# Patient Record
Sex: FEMALE | Race: AMERICAN INDIAN OR ALASKA NATIVE | Employment: FULL TIME | URBAN - METROPOLITAN AREA
[De-identification: names, ages, dates, MRNs, and addresses within clinical notes are randomized per-mention and may not be internally consistent; named-entity substitution may affect disease eponyms.]

---

## 2017-07-25 ENCOUNTER — OFFICE VISIT (OUTPATIENT)
Dept: OBGYN CLINIC | Facility: CLINIC | Age: 37
End: 2017-07-25

## 2017-07-25 VITALS
HEART RATE: 63 BPM | WEIGHT: 131.81 LBS | SYSTOLIC BLOOD PRESSURE: 114 MMHG | DIASTOLIC BLOOD PRESSURE: 76 MMHG | BODY MASS INDEX: 26 KG/M2

## 2017-07-25 DIAGNOSIS — Z01.419 WELL WOMAN EXAM WITH ROUTINE GYNECOLOGICAL EXAM: Primary | ICD-10-CM

## 2017-07-25 PROCEDURE — 99395 PREV VISIT EST AGE 18-39: CPT | Performed by: OBSTETRICS & GYNECOLOGY

## 2017-07-25 RX ORDER — FLUCONAZOLE 150 MG/1
150 TABLET ORAL ONCE
Qty: 2 TABLET | Refills: 0 | Status: SHIPPED | OUTPATIENT
Start: 2017-07-25 | End: 2017-07-25

## 2017-07-25 NOTE — PROGRESS NOTES
HPI:    Patient ID: Tiffanie Grimaldo is a 39year old female. HPI  Well woman visit  Total abdominal hysterectomy last year. She feels \"great\" no longer having pelvic abdominal and back pain. No longer having horrible vaginal bleeding and menstruation. Never Used                      Alcohol use: No                 PHYSICAL EXAM:    Physical Exam   Constitutional: She appears well-developed and well-nourished. HENT:   Head: Normocephalic. Eyes: Pupils are equal, round, and reactive to light.    Neck: Prescriptions Disp Refills    fluconazole (DIFLUCAN) 150 MG Oral Tab 2 tablet 0      Sig: Take 1 tablet (150 mg total) by mouth once.  Take second tablet three days after first           Imaging & Referrals:  None        QT#[id number]

## 2018-04-07 ENCOUNTER — CHARTING TRANS (OUTPATIENT)
Dept: OTHER | Age: 38
End: 2018-04-07

## 2018-08-18 ENCOUNTER — OFFICE VISIT (OUTPATIENT)
Dept: ENDOCRINOLOGY CLINIC | Facility: CLINIC | Age: 38
End: 2018-08-18
Payer: COMMERCIAL

## 2018-08-18 VITALS
HEIGHT: 60 IN | WEIGHT: 127 LBS | DIASTOLIC BLOOD PRESSURE: 82 MMHG | HEART RATE: 59 BPM | SYSTOLIC BLOOD PRESSURE: 120 MMHG | BODY MASS INDEX: 24.94 KG/M2

## 2018-08-18 DIAGNOSIS — Z13.9 SCREENING FOR CONDITION: ICD-10-CM

## 2018-08-18 DIAGNOSIS — Z13.1 DIABETES MELLITUS SCREENING: Primary | ICD-10-CM

## 2018-08-18 DIAGNOSIS — Z13.29 THYROID DISORDER SCREENING: ICD-10-CM

## 2018-08-18 DIAGNOSIS — E16.1 REACTIVE HYPOGLYCEMIA: ICD-10-CM

## 2018-08-18 LAB
CARTRIDGE LOT#: NORMAL NUMERIC
GLUCOSE BLOOD: 153
HEMOGLOBIN A1C: 5.3 % (ref 4.3–5.6)
TEST STRIP LOT #: NORMAL NUMERIC

## 2018-08-18 PROCEDURE — 82962 GLUCOSE BLOOD TEST: CPT | Performed by: INTERNAL MEDICINE

## 2018-08-18 PROCEDURE — 36416 COLLJ CAPILLARY BLOOD SPEC: CPT | Performed by: INTERNAL MEDICINE

## 2018-08-18 PROCEDURE — 99203 OFFICE O/P NEW LOW 30 MIN: CPT | Performed by: INTERNAL MEDICINE

## 2018-08-18 PROCEDURE — 99212 OFFICE O/P EST SF 10 MIN: CPT | Performed by: INTERNAL MEDICINE

## 2018-08-18 PROCEDURE — 83036 HEMOGLOBIN GLYCOSYLATED A1C: CPT | Performed by: INTERNAL MEDICINE

## 2018-08-18 RX ORDER — BLOOD SUGAR DIAGNOSTIC
STRIP MISCELLANEOUS
Qty: 50 STRIP | Refills: 0 | Status: SHIPPED | OUTPATIENT
Start: 2018-08-18 | End: 2019-02-12 | Stop reason: ALTCHOICE

## 2018-08-18 RX ORDER — MULTIVIT-MIN/IRON FUM/FOLIC AC 7.5 MG-4
1 TABLET ORAL DAILY
COMMUNITY

## 2018-08-18 NOTE — H&P
New Patient Evaluation - History and Physical    CONSULT - Reason for Visit: Episodes of shaking and weakness, Evaluation of thyroid function  Requesting Physician: Self referral    CHIEF COMPLAINT:    Episodes of shaking and weakness  Evaluation of thyroi ALLERGIES:    Plasma, Human           HIVES    SOCIAL HISTORY:    Social History    Marital status:              Spouse name:                       Years of education:                 Number of children:               Social History Main Topic no adenopathy, thyroid symmetric, not enlarged and no tenderness  HEMATOLOGIC/LYMPHATICS:  no cervical lymphadenopathy and no supraclavicular lymphadenopathy  LUNGS: clear to auscultation bilaterally, no crackles or wheezing  CARDIOVASCULAR:  regular rate Metabolic Panel (8)      TSH W Reflex To Free T4      8/18/2018  Familia Naranjo MD

## 2018-08-18 NOTE — PROGRESS NOTES
Patient presents for a freestyle yolanda CGM placement. Patient provided with instructions including to not dive greater than 3 feet under water while swimming and to not spend more than 30 minutes bathing at a time.  Patient instructed to bring the sensor ba

## 2018-08-28 ENCOUNTER — OFFICE VISIT (OUTPATIENT)
Dept: ENDOCRINOLOGY CLINIC | Facility: CLINIC | Age: 38
End: 2018-08-28
Payer: COMMERCIAL

## 2018-08-28 VITALS
HEIGHT: 60 IN | HEART RATE: 67 BPM | DIASTOLIC BLOOD PRESSURE: 85 MMHG | BODY MASS INDEX: 24.9 KG/M2 | SYSTOLIC BLOOD PRESSURE: 124 MMHG | WEIGHT: 126.81 LBS

## 2018-08-28 DIAGNOSIS — R25.1 EPISODE OF SHAKING: Primary | ICD-10-CM

## 2018-08-28 PROCEDURE — 99213 OFFICE O/P EST LOW 20 MIN: CPT | Performed by: INTERNAL MEDICINE

## 2018-08-28 PROCEDURE — 99212 OFFICE O/P EST SF 10 MIN: CPT | Performed by: INTERNAL MEDICINE

## 2018-08-28 PROCEDURE — 95251 CONT GLUC MNTR ANALYSIS I&R: CPT | Performed by: INTERNAL MEDICINE

## 2018-08-28 NOTE — PROGRESS NOTES
Return Office Visit     CHIEF COMPLAINT:  Patient presents with: Other: Screening for hypoglycemia. HISTORY OF PRESENT ILLNESS:  Eleonora Jang is a 40year old female who presents for follow up for evaluation of episodes of shaking and weakness.   H edema  Endocrine: Negative for: polyuria, polydypsia  Skin: Negative for: rash, blister, cellulitis,       PHYSICAL EXAM:    08/28/18  1741   BP: 124/85   Pulse: 67   Weight: 126 lb 12.8 oz (57.5 kg)   Height: 5' (1.524 m)     BMI: Body mass index is 24.76

## 2018-11-01 VITALS
RESPIRATION RATE: 15 BRPM | HEIGHT: 60 IN | WEIGHT: 127 LBS | HEART RATE: 60 BPM | BODY MASS INDEX: 24.94 KG/M2 | TEMPERATURE: 98.7 F

## 2019-01-15 ENCOUNTER — OFFICE VISIT (OUTPATIENT)
Dept: OTOLARYNGOLOGY | Facility: CLINIC | Age: 39
End: 2019-01-15
Payer: COMMERCIAL

## 2019-01-15 VITALS — TEMPERATURE: 99 F | DIASTOLIC BLOOD PRESSURE: 82 MMHG | SYSTOLIC BLOOD PRESSURE: 124 MMHG

## 2019-01-15 DIAGNOSIS — J32.9 CHRONIC SINUSITIS, UNSPECIFIED LOCATION: ICD-10-CM

## 2019-01-15 DIAGNOSIS — J34.2 DEVIATED NASAL SEPTUM: Primary | ICD-10-CM

## 2019-01-15 PROCEDURE — 99203 OFFICE O/P NEW LOW 30 MIN: CPT | Performed by: OTOLARYNGOLOGY

## 2019-01-15 PROCEDURE — 99212 OFFICE O/P EST SF 10 MIN: CPT | Performed by: OTOLARYNGOLOGY

## 2019-01-15 RX ORDER — MONTELUKAST SODIUM 10 MG/1
10 TABLET ORAL NIGHTLY
Qty: 30 TABLET | Refills: 3 | Status: SHIPPED | OUTPATIENT
Start: 2019-01-15

## 2019-01-15 RX ORDER — AZELASTINE 1 MG/ML
2 SPRAY, METERED NASAL 2 TIMES DAILY
Qty: 1 BOTTLE | Refills: 0 | Status: SHIPPED | OUTPATIENT
Start: 2019-01-15 | End: 2019-02-12

## 2019-01-15 RX ORDER — AMOXICILLIN AND CLAVULANATE POTASSIUM 875; 125 MG/1; MG/1
1 TABLET, FILM COATED ORAL EVERY 12 HOURS
Qty: 20 TABLET | Refills: 0 | Status: SHIPPED | OUTPATIENT
Start: 2019-01-15 | End: 2020-02-03

## 2019-01-15 NOTE — PROGRESS NOTES
Lennox Overly is a 45year old female.   Patient presents with:  Ear Problem: chronic right ear infection  Sinus Problem: sinus congestion,pressure headache      HISTORY OF PRESENT ILLNESS    She presents with a long history of chronic ear problems and sinu intolerance and heat intolerance. Neuro Negative Tremors. Psych Negative Anxiety and depression. Integumentary Negative Frequent skin infections, pigment change and rash. Hema/Lymph Negative Easy bleeding and easy bruising.            PHYSICAL EXAM (twelve) hours. , Disp: 60 tablet, Rfl: 3  •  Azelastine HCl 0.1 % Nasal Solution, 2 sprays by Nasal route 2 (two) times daily. , Disp: 1 Bottle, Rfl: 0  •  Multiple Vitamins-Minerals (MULTI-VITAMIN/MINERALS) Oral Tab, Take 1 tablet by mouth daily.  Taking 3-

## 2019-01-16 ENCOUNTER — TELEPHONE (OUTPATIENT)
Dept: OTOLARYNGOLOGY | Facility: CLINIC | Age: 39
End: 2019-01-16

## 2019-01-16 NOTE — TELEPHONE ENCOUNTER
Left message for pt to call back to inform that she can schedule CT sinus at this time, authorization number [id number], valid 1-16-19 thru 2-14-19 to be done at [facility]. It is the patient's responsibility to verify benefits and out of po

## 2019-01-19 ENCOUNTER — TELEPHONE (OUTPATIENT)
Dept: OTOLARYNGOLOGY | Facility: CLINIC | Age: 39
End: 2019-01-19

## 2019-01-30 ENCOUNTER — HOSPITAL ENCOUNTER (OUTPATIENT)
Dept: CT IMAGING | Facility: HOSPITAL | Age: 39
Discharge: HOME | End: 2019-01-30
Attending: OTOLARYNGOLOGY
Payer: COMMERCIAL

## 2019-01-30 ENCOUNTER — HOSPITAL ENCOUNTER (OUTPATIENT)
Dept: CT IMAGING | Facility: HOSPITAL | Age: 39
End: 2019-01-30
Attending: OTOLARYNGOLOGY
Payer: COMMERCIAL

## 2019-01-30 DIAGNOSIS — J34.2 DEVIATED NASAL SEPTUM: ICD-10-CM

## 2019-01-30 PROCEDURE — 70486 CT MAXILLOFACIAL W/O DYE: CPT | Performed by: OTOLARYNGOLOGY

## 2019-02-12 ENCOUNTER — OFFICE VISIT (OUTPATIENT)
Dept: OTOLARYNGOLOGY | Facility: CLINIC | Age: 39
End: 2019-02-12
Payer: COMMERCIAL

## 2019-02-12 VITALS
BODY MASS INDEX: 24.94 KG/M2 | TEMPERATURE: 99 F | SYSTOLIC BLOOD PRESSURE: 120 MMHG | WEIGHT: 127 LBS | DIASTOLIC BLOOD PRESSURE: 72 MMHG | HEIGHT: 60 IN

## 2019-02-12 DIAGNOSIS — J34.2 DEVIATED NASAL SEPTUM: Primary | ICD-10-CM

## 2019-02-12 PROCEDURE — 99212 OFFICE O/P EST SF 10 MIN: CPT | Performed by: OTOLARYNGOLOGY

## 2019-02-12 PROCEDURE — 99214 OFFICE O/P EST MOD 30 MIN: CPT | Performed by: OTOLARYNGOLOGY

## 2019-02-12 RX ORDER — CELECOXIB 200 MG/1
200 CAPSULE ORAL DAILY PRN
Qty: 30 CAPSULE | Refills: 0 | Status: SHIPPED | OUTPATIENT
Start: 2019-02-12 | End: 2019-03-14

## 2019-02-12 RX ORDER — AZELASTINE 1 MG/ML
2 SPRAY, METERED NASAL 2 TIMES DAILY
Qty: 1 BOTTLE | Refills: 3 | Status: SHIPPED | OUTPATIENT
Start: 2019-02-12 | End: 2020-02-03

## 2019-02-12 RX ORDER — CHOLECALCIFEROL (VITAMIN D3) 50 MCG
2000 TABLET ORAL
COMMUNITY
Start: 2018-02-12

## 2019-02-12 NOTE — PROGRESS NOTES
Hoda Jiménez is a 45year old female. Patient presents with:   Follow - Up: 1 mo f/u to go over CT results and sinusitis-pt has not been taking medications because they make her tired      HISTORY OF PRESENT ILLNESS  She presents with a long history of ch Multiple allergies        Past Surgical History:   Procedure Laterality Date   • PRIOR MYOMECTOMY      3 prev surgeries   • REMOVAL OF OVARIAN CYST(S)  2010   • TOTAL ABDOMINAL HYSTERECTOMY  7/13/16         REVIEW OF SYSTEMS    System Neg/Pos Details   Con Posterior cervical. Supraclavicular.    TMJ   tender to palpation on the right   Nose/Mouth/Throat Normal External nose - Normal. Lips/teeth/gums - Normal. Tonsils - Normal. Oropharynx - Normal.   Nose/Mouth/Throat Normal Nares - Right: Normal Left: Normal.

## 2019-03-01 ENCOUNTER — HOSPITAL ENCOUNTER (EMERGENCY)
Facility: HOSPITAL | Age: 39
Discharge: HOME | End: 2019-03-01
Attending: EMERGENCY MEDICINE
Payer: COMMERCIAL

## 2019-03-01 VITALS
SYSTOLIC BLOOD PRESSURE: 141 MMHG | OXYGEN SATURATION: 96 % | DIASTOLIC BLOOD PRESSURE: 101 MMHG | TEMPERATURE: 98 F | HEIGHT: 60 IN | HEART RATE: 72 BPM | WEIGHT: 128 LBS | BODY MASS INDEX: 25.13 KG/M2 | RESPIRATION RATE: 18 BRPM

## 2019-03-01 DIAGNOSIS — R10.30 LOWER ABDOMINAL PAIN: Primary | ICD-10-CM

## 2019-03-01 LAB
ALBUMIN SERPL-MCNC: 3.8 G/DL (ref 3.4–5)
ALP LIVER SERPL-CCNC: 81 U/L (ref 37–98)
ALT SERPL-CCNC: 49 U/L (ref 13–56)
ANION GAP SERPL CALC-SCNC: 5 MMOL/L (ref 0–18)
AST SERPL-CCNC: 22 U/L (ref 15–37)
BASOPHILS # BLD AUTO: 0.01 X10(3) UL (ref 0–0.2)
BASOPHILS NFR BLD AUTO: 0.2 %
BILIRUB DIRECT SERPL-MCNC: <0.1 MG/DL (ref 0–0.2)
BILIRUB SERPL-MCNC: 0.3 MG/DL (ref 0.1–2)
BILIRUB UR QL: NEGATIVE
BUN BLD-MCNC: 9 MG/DL (ref 7–18)
BUN/CREAT SERPL: 12.7 (ref 10–20)
CALCIUM BLD-MCNC: 9.1 MG/DL (ref 8.5–10.1)
CHLORIDE SERPL-SCNC: 109 MMOL/L (ref 98–107)
CLARITY UR: CLEAR
CO2 SERPL-SCNC: 28 MMOL/L (ref 21–32)
COLOR UR: YELLOW
CREAT BLD-MCNC: 0.71 MG/DL (ref 0.55–1.02)
DEPRECATED RDW RBC AUTO: 39.7 FL (ref 35.1–46.3)
EOSINOPHIL # BLD AUTO: 0.16 X10(3) UL (ref 0–0.7)
EOSINOPHIL NFR BLD AUTO: 2.6 %
ERYTHROCYTE [DISTWIDTH] IN BLOOD BY AUTOMATED COUNT: 12.2 % (ref 11–15)
GLUCOSE BLD-MCNC: 119 MG/DL (ref 70–99)
GLUCOSE UR-MCNC: NEGATIVE MG/DL
HCT VFR BLD AUTO: 40.2 % (ref 35–48)
HGB BLD-MCNC: 13.4 G/DL (ref 12–16)
HGB UR QL STRIP.AUTO: NEGATIVE
IMM GRANULOCYTES # BLD AUTO: 0.03 X10(3) UL (ref 0–1)
IMM GRANULOCYTES NFR BLD: 0.5 %
KETONES UR-MCNC: NEGATIVE MG/DL
LIPASE SERPL-CCNC: 297 U/L (ref 73–393)
LYMPHOCYTES # BLD AUTO: 2.42 X10(3) UL (ref 1–4)
LYMPHOCYTES NFR BLD AUTO: 39.6 %
M PROTEIN MFR SERPL ELPH: 7.5 G/DL (ref 6.4–8.2)
MCH RBC QN AUTO: 29.6 PG (ref 26–34)
MCHC RBC AUTO-ENTMCNC: 33.3 G/DL (ref 31–37)
MCV RBC AUTO: 88.7 FL (ref 80–100)
MONOCYTES # BLD AUTO: 0.71 X10(3) UL (ref 0.1–1)
MONOCYTES NFR BLD AUTO: 11.6 %
NEUTROPHILS # BLD AUTO: 2.78 X10 (3) UL (ref 1.5–7.7)
NEUTROPHILS # BLD AUTO: 2.78 X10(3) UL (ref 1.5–7.7)
NEUTROPHILS NFR BLD AUTO: 45.5 %
NITRITE UR QL STRIP.AUTO: NEGATIVE
OSMOLALITY SERPL CALC.SUM OF ELEC: 294 MOSM/KG (ref 275–295)
PH UR: 6 [PH] (ref 5–8)
PLATELET # BLD AUTO: 213 10(3)UL (ref 150–450)
POTASSIUM SERPL-SCNC: 3.6 MMOL/L (ref 3.5–5.1)
PROT UR-MCNC: NEGATIVE MG/DL
RBC # BLD AUTO: 4.53 X10(6)UL (ref 3.8–5.3)
RBC #/AREA URNS AUTO: 2 /HPF
SODIUM SERPL-SCNC: 142 MMOL/L (ref 136–145)
SP GR UR STRIP: 1.02 (ref 1–1.03)
UROBILINOGEN UR STRIP-ACNC: <2
VIT C UR-MCNC: NEGATIVE MG/DL
WBC # BLD AUTO: 6.1 X10(3) UL (ref 4–11)
WBC #/AREA URNS AUTO: 4 /HPF

## 2019-03-01 PROCEDURE — 99283 EMERGENCY DEPT VISIT LOW MDM: CPT

## 2019-03-01 PROCEDURE — 85025 COMPLETE CBC W/AUTO DIFF WBC: CPT | Performed by: EMERGENCY MEDICINE

## 2019-03-01 PROCEDURE — 80048 BASIC METABOLIC PNL TOTAL CA: CPT | Performed by: EMERGENCY MEDICINE

## 2019-03-01 PROCEDURE — 80076 HEPATIC FUNCTION PANEL: CPT | Performed by: EMERGENCY MEDICINE

## 2019-03-01 PROCEDURE — 36415 COLL VENOUS BLD VENIPUNCTURE: CPT

## 2019-03-01 PROCEDURE — 81001 URINALYSIS AUTO W/SCOPE: CPT | Performed by: EMERGENCY MEDICINE

## 2019-03-01 PROCEDURE — 83690 ASSAY OF LIPASE: CPT | Performed by: EMERGENCY MEDICINE

## 2019-03-01 RX ORDER — DICYCLOMINE HCL 20 MG
20 TABLET ORAL 4 TIMES DAILY PRN
Qty: 30 TABLET | Refills: 0 | Status: SHIPPED | OUTPATIENT
Start: 2019-03-01 | End: 2020-02-03

## 2019-03-02 NOTE — ED NOTES
Patient states that she was constipated 1.5 months ago for 3 weeks. Started a fiber diet and constipation resolved. States she came now due to pain in the same area. Describes it as pinching burning sensation. Not tender to palpation.  Last bm was today at

## 2019-03-03 NOTE — ED PROVIDER NOTES
Patient Seen in: [facility] AND [facility] Emergency Department    History   Patient presents with:  Abdominal Pain      HPI    Patient presents to the ED complaining of pain to her lower abdomen and a bruise over her right lower quadrant.   She states she has ha 18   Temp 98.1 °F (36.7 °C)   Temp src Oral   SpO2 99 %   O2 Device None (Room air)       Physical Exam   Constitutional: She is oriented to person, place, and time. She appears well-developed and well-nourished. No distress.    HENT:   Head: Normocephalic the individual orders.    CBC W/ DIFFERENTIAL         Imaging Results Available and Reviewed while in ED:     ED Medications Administered: Medications - No data to display      MDM      03/01/19  2100 03/01/19  2115 03/01/19  2230   BP: (!) 159/102 (!) 145/ [address]2480  [phone]    Schedule an appointment as soon as possible for a visit in 2 weeks      Nneka Hauser, [address] [place] [id number] [phone]    Schedule an appointment as soon as possible for a visit in 3 days        Medications

## 2019-03-18 ENCOUNTER — OFFICE VISIT (OUTPATIENT)
Dept: GASTROENTEROLOGY | Facility: CLINIC | Age: 39
End: 2019-03-18
Payer: COMMERCIAL

## 2019-03-18 ENCOUNTER — LAB ENCOUNTER (OUTPATIENT)
Dept: LAB | Facility: HOSPITAL | Age: 39
End: 2019-03-18
Attending: INTERNAL MEDICINE
Payer: COMMERCIAL

## 2019-03-18 VITALS
WEIGHT: 130 LBS | DIASTOLIC BLOOD PRESSURE: 88 MMHG | HEIGHT: 60.5 IN | SYSTOLIC BLOOD PRESSURE: 145 MMHG | BODY MASS INDEX: 24.87 KG/M2 | HEART RATE: 65 BPM

## 2019-03-18 DIAGNOSIS — R10.31 RLQ DISCOMFORT: Primary | ICD-10-CM

## 2019-03-18 DIAGNOSIS — R10.31 RLQ DISCOMFORT: ICD-10-CM

## 2019-03-18 DIAGNOSIS — K59.00 CONSTIPATION, UNSPECIFIED CONSTIPATION TYPE: ICD-10-CM

## 2019-03-18 DIAGNOSIS — H04.123 DRY EYES: ICD-10-CM

## 2019-03-18 LAB
CRP SERPL-MCNC: <0.29 MG/DL (ref ?–0.3)
ERYTHROCYTE [SEDIMENTATION RATE] IN BLOOD: 7 MM/HR (ref 0–20)
IGA SERPL-MCNC: 205 MG/DL (ref 70–312)
TSI SER-ACNC: 1.2 MIU/ML (ref 0.36–3.74)

## 2019-03-18 PROCEDURE — 86038 ANTINUCLEAR ANTIBODIES: CPT

## 2019-03-18 PROCEDURE — 99212 OFFICE O/P EST SF 10 MIN: CPT | Performed by: INTERNAL MEDICINE

## 2019-03-18 PROCEDURE — 86039 ANTINUCLEAR ANTIBODIES (ANA): CPT

## 2019-03-18 PROCEDURE — 36415 COLL VENOUS BLD VENIPUNCTURE: CPT

## 2019-03-18 PROCEDURE — 86140 C-REACTIVE PROTEIN: CPT

## 2019-03-18 PROCEDURE — 99203 OFFICE O/P NEW LOW 30 MIN: CPT | Performed by: INTERNAL MEDICINE

## 2019-03-18 PROCEDURE — 85652 RBC SED RATE AUTOMATED: CPT

## 2019-03-18 PROCEDURE — 84443 ASSAY THYROID STIM HORMONE: CPT

## 2019-03-18 PROCEDURE — 82784 ASSAY IGA/IGD/IGG/IGM EACH: CPT

## 2019-03-18 PROCEDURE — 83516 IMMUNOASSAY NONANTIBODY: CPT

## 2019-03-18 NOTE — PATIENT INSTRUCTIONS
1.  Gradually increase fiber to a goal of 20-25 g daily. 2.  Obtain additional blood work. 3.  Make an appointment with a primary care physician: Dr. Pattie Oocnnell or Dr. Chelsea Cristobal at [phone]  4. Further advice pending the above results.

## 2019-03-20 LAB — TTG IGA SER-ACNC: 0.4 U/ML (ref ?–7)

## 2019-03-21 LAB — NUCLEAR IGG TITR SER IF: POSITIVE {TITER}

## 2019-03-23 LAB — ANA NUCLEOLAR TITR SER IF: 80 {TITER}

## 2019-03-25 ENCOUNTER — TELEPHONE (OUTPATIENT)
Dept: GASTROENTEROLOGY | Facility: CLINIC | Age: 39
End: 2019-03-25

## 2019-03-25 NOTE — TELEPHONE ENCOUNTER
Pt requesting to spk w rn regarding lab results received, pt has questions, pls call at:[phone],thanks.

## 2019-03-26 NOTE — TELEPHONE ENCOUNTER
I spoke to Karine. All of her labs were normal with the exception of an JUANPABLO of 1:80 speckled. The significance of this finding is uncertain. I cannot exclude a connective tissue disorder especially in light of the patient's dry eyes and dry mouth.   I h

## 2019-03-27 ENCOUNTER — APPOINTMENT (OUTPATIENT)
Dept: LAB | Facility: HOSPITAL | Age: 39
End: 2019-03-27
Attending: INTERNAL MEDICINE
Payer: COMMERCIAL

## 2019-03-27 ENCOUNTER — OFFICE VISIT (OUTPATIENT)
Dept: RHEUMATOLOGY | Facility: CLINIC | Age: 39
End: 2019-03-27
Payer: COMMERCIAL

## 2019-03-27 VITALS
BODY MASS INDEX: 24.35 KG/M2 | HEIGHT: 61 IN | WEIGHT: 129 LBS | HEART RATE: 69 BPM | SYSTOLIC BLOOD PRESSURE: 139 MMHG | DIASTOLIC BLOOD PRESSURE: 92 MMHG

## 2019-03-27 DIAGNOSIS — R76.8 POSITIVE ANA (ANTINUCLEAR ANTIBODY): ICD-10-CM

## 2019-03-27 DIAGNOSIS — R76.8 POSITIVE ANA (ANTINUCLEAR ANTIBODY): Primary | ICD-10-CM

## 2019-03-27 DIAGNOSIS — R73.09 ELEVATED GLUCOSE: ICD-10-CM

## 2019-03-27 DIAGNOSIS — M35.00 SICCA, UNSPECIFIED TYPE (HCC): ICD-10-CM

## 2019-03-27 DIAGNOSIS — R35.89 POLYURIA: ICD-10-CM

## 2019-03-27 LAB
CRP SERPL-MCNC: 0.44 MG/DL (ref ?–0.3)
ERYTHROCYTE [SEDIMENTATION RATE] IN BLOOD: 12 MM/HR (ref 0–20)
EST. AVERAGE GLUCOSE BLD GHB EST-MCNC: 111 MG/DL (ref 68–126)
HBA1C MFR BLD HPLC: 5.5 % (ref ?–5.7)
HCV AB SERPL QL IA: NONREACTIVE
RHEUMATOID FACT SERPL-ACNC: <10 IU/ML (ref ?–15)

## 2019-03-27 PROCEDURE — 86803 HEPATITIS C AB TEST: CPT

## 2019-03-27 PROCEDURE — 86235 NUCLEAR ANTIGEN ANTIBODY: CPT

## 2019-03-27 PROCEDURE — 86225 DNA ANTIBODY NATIVE: CPT

## 2019-03-27 PROCEDURE — 86140 C-REACTIVE PROTEIN: CPT

## 2019-03-27 PROCEDURE — 99244 OFF/OP CNSLTJ NEW/EST MOD 40: CPT | Performed by: INTERNAL MEDICINE

## 2019-03-27 PROCEDURE — 99212 OFFICE O/P EST SF 10 MIN: CPT | Performed by: INTERNAL MEDICINE

## 2019-03-27 PROCEDURE — 86431 RHEUMATOID FACTOR QUANT: CPT

## 2019-03-27 PROCEDURE — 85652 RBC SED RATE AUTOMATED: CPT

## 2019-03-27 PROCEDURE — 36415 COLL VENOUS BLD VENIPUNCTURE: CPT

## 2019-03-27 PROCEDURE — 83036 HEMOGLOBIN GLYCOSYLATED A1C: CPT

## 2019-03-27 NOTE — PROGRESS NOTES
Rocio Hardin is a 45year old female who presents for Patient presents with:  Abnormal Labs  . HPI:     I had the pleasure of seeing Rocio Hardin on 3/27/2019 for evaluation. She was referred by Dr. Mercedes Ann.     She is a pleasant 45year old w (two) times daily. Disp: 1 Bottle Rfl: 3   Amoxicillin-Pot Clavulanate 875-125 MG Oral Tab Take 1 tablet by mouth every 12 (twelve) hours. Disp: 20 tablet Rfl: 0   Montelukast Sodium 10 MG Oral Tab Take 1 tablet (10 mg total) by mouth nightly.  Disp: 30 tab Neuro:  No numbness or tingling, she was getting migraines/ headache - was getting this end of feb daily, drank a lot of water to hydrate and they improved, , no hx of seizures,   Psych: no hx of anxiety or depression  ENDO: no hx of thyroid disease, no h

## 2019-03-29 LAB — DSDNA AB TITR SER: <10 {TITER}

## 2019-03-29 NOTE — PROGRESS NOTES
HPI:    Patient ID: Tiffanie Grimaldo is a 45year old female. HPI  The patient presents today to discuss erratic bowel habits and right lower quadrant abdominal pain. The patient normally has a bowel movement after eating.   If she eats \"10 meals she wi 12.8 oz (57.5 kg)  08/18/18 : 127 lb (57.6 kg)    Body mass index is 24.97 kg/m². Current Outpatient Medications:  Cholecalciferol (VITAMIN D) 2000 units Oral Tab Take 2,000 Units by mouth.  Disp:  Rfl:    Loratadine-Pseudoephedrine ER 5-120 MG Oral time.   Psychiatric: She has a normal mood and affect. Her behavior is normal.   Nursing note and vitals reviewed.              ASSESSMENT/PLAN:   Rlq discomfort  (primary encounter diagnosis)  Dry eyes  Constipation, unspecified constipation type  The rigo

## 2019-03-31 LAB — SCLERODERMA (SCL-70) (ENA) AB, IGG: 5 AU/ML

## 2020-02-03 ENCOUNTER — OFFICE VISIT (OUTPATIENT)
Dept: OBGYN CLINIC | Facility: CLINIC | Age: 40
End: 2020-02-03
Payer: COMMERCIAL

## 2020-02-03 VITALS — SYSTOLIC BLOOD PRESSURE: 156 MMHG | BODY MASS INDEX: 25 KG/M2 | DIASTOLIC BLOOD PRESSURE: 106 MMHG | WEIGHT: 134 LBS

## 2020-02-03 DIAGNOSIS — Z12.31 SCREENING MAMMOGRAM, ENCOUNTER FOR: Primary | ICD-10-CM

## 2020-02-03 DIAGNOSIS — Z01.419 WELL WOMAN EXAM WITH ROUTINE GYNECOLOGICAL EXAM: ICD-10-CM

## 2020-02-03 DIAGNOSIS — R03.0 ELEVATED BLOOD PRESSURE READING: ICD-10-CM

## 2020-02-03 DIAGNOSIS — B37.3 VAGINAL YEAST INFECTION: ICD-10-CM

## 2020-02-03 PROBLEM — B37.31 VAGINAL YEAST INFECTION: Status: ACTIVE | Noted: 2020-02-03

## 2020-02-03 PROBLEM — R25.1 SHAKING: Status: RESOLVED | Noted: 2018-08-28 | Resolved: 2020-02-03

## 2020-02-03 PROCEDURE — 99395 PREV VISIT EST AGE 18-39: CPT | Performed by: OBSTETRICS & GYNECOLOGY

## 2020-02-03 NOTE — PROGRESS NOTES
HPI:    Patient ID: Rahul Thompson is a 44year old year old female. HPI  Well woman visit  Brings a problem of recurrent yeast infections for the past 2 years she states more recently she has noted worsening and has not resolved with Diflucan tablets. Pelvic exam was performed with patient supine and chaperone present. External genitalia- normal.  Bartholin's and Walford's glands normal.  Urethral meatus- without lesions, mass, or discharge. Urethra- normal without lesion, cyst, mass, or tenderness. 0  [DISCONTINUED] Azelastine HCl 0.1 % Nasal Solution, 2 sprays by Nasal route 2 (two) times daily. , Disp: 1 Bottle, Rfl: 3  [DISCONTINUED] Amoxicillin-Pot Clavulanate 875-125 MG Oral Tab, Take 1 tablet by mouth every 12 (twelve) hours. , Disp: 20 tablet, R

## 2020-02-04 ENCOUNTER — OFFICE VISIT (OUTPATIENT)
Dept: FAMILY MEDICINE CLINIC | Facility: CLINIC | Age: 40
End: 2020-02-04
Payer: COMMERCIAL

## 2020-02-04 ENCOUNTER — LAB ENCOUNTER (OUTPATIENT)
Dept: LAB | Age: 40
End: 2020-02-04
Attending: FAMILY MEDICINE
Payer: COMMERCIAL

## 2020-02-04 VITALS
WEIGHT: 134 LBS | HEART RATE: 62 BPM | BODY MASS INDEX: 25.3 KG/M2 | TEMPERATURE: 98 F | SYSTOLIC BLOOD PRESSURE: 143 MMHG | DIASTOLIC BLOOD PRESSURE: 89 MMHG | HEIGHT: 61 IN

## 2020-02-04 DIAGNOSIS — D50.9 IRON DEFICIENCY ANEMIA, UNSPECIFIED IRON DEFICIENCY ANEMIA TYPE: ICD-10-CM

## 2020-02-04 DIAGNOSIS — R00.2 PALPITATIONS: ICD-10-CM

## 2020-02-04 DIAGNOSIS — Z13.1 DIABETES MELLITUS SCREENING: ICD-10-CM

## 2020-02-04 DIAGNOSIS — I10 ESSENTIAL HYPERTENSION: Primary | ICD-10-CM

## 2020-02-04 DIAGNOSIS — R42 DIZZINESS: ICD-10-CM

## 2020-02-04 DIAGNOSIS — R35.0 URINARY FREQUENCY: ICD-10-CM

## 2020-02-04 DIAGNOSIS — I10 ESSENTIAL HYPERTENSION: ICD-10-CM

## 2020-02-04 LAB
ANION GAP SERPL CALC-SCNC: 4 MMOL/L (ref 0–18)
BASOPHILS # BLD AUTO: 0.02 X10(3) UL (ref 0–0.2)
BASOPHILS NFR BLD AUTO: 0.3 %
BUN BLD-MCNC: 15 MG/DL (ref 7–18)
BUN/CREAT SERPL: 18.1 (ref 10–20)
CALCIUM BLD-MCNC: 9.3 MG/DL (ref 8.5–10.1)
CHLORIDE SERPL-SCNC: 106 MMOL/L (ref 98–112)
CO2 SERPL-SCNC: 30 MMOL/L (ref 21–32)
CREAT BLD-MCNC: 0.83 MG/DL (ref 0.55–1.02)
DEPRECATED RDW RBC AUTO: 39.9 FL (ref 35.1–46.3)
EOSINOPHIL # BLD AUTO: 0.13 X10(3) UL (ref 0–0.7)
EOSINOPHIL NFR BLD AUTO: 1.9 %
ERYTHROCYTE [DISTWIDTH] IN BLOOD BY AUTOMATED COUNT: 12.2 % (ref 11–15)
EST. AVERAGE GLUCOSE BLD GHB EST-MCNC: 108 MG/DL (ref 68–126)
GLUCOSE BLD-MCNC: 96 MG/DL (ref 70–99)
HBA1C MFR BLD HPLC: 5.4 % (ref ?–5.7)
HCT VFR BLD AUTO: 42.8 % (ref 35–48)
HGB BLD-MCNC: 14.3 G/DL (ref 12–16)
IMM GRANULOCYTES # BLD AUTO: 0.03 X10(3) UL (ref 0–1)
IMM GRANULOCYTES NFR BLD: 0.4 %
LYMPHOCYTES # BLD AUTO: 2.28 X10(3) UL (ref 1–4)
LYMPHOCYTES NFR BLD AUTO: 32.6 %
MCH RBC QN AUTO: 29.9 PG (ref 26–34)
MCHC RBC AUTO-ENTMCNC: 33.4 G/DL (ref 31–37)
MCV RBC AUTO: 89.5 FL (ref 80–100)
MONOCYTES # BLD AUTO: 0.83 X10(3) UL (ref 0.1–1)
MONOCYTES NFR BLD AUTO: 11.9 %
NEUTROPHILS # BLD AUTO: 3.7 X10 (3) UL (ref 1.5–7.7)
NEUTROPHILS # BLD AUTO: 3.7 X10(3) UL (ref 1.5–7.7)
NEUTROPHILS NFR BLD AUTO: 52.9 %
OSMOLALITY SERPL CALC.SUM OF ELEC: 291 MOSM/KG (ref 275–295)
PATIENT FASTING Y/N/NP: NO
PLATELET # BLD AUTO: 231 10(3)UL (ref 150–450)
POTASSIUM SERPL-SCNC: 4.2 MMOL/L (ref 3.5–5.1)
RBC # BLD AUTO: 4.78 X10(6)UL (ref 3.8–5.3)
SODIUM SERPL-SCNC: 140 MMOL/L (ref 136–145)
TSI SER-ACNC: 0.98 MIU/ML (ref 0.36–3.74)
WBC # BLD AUTO: 7 X10(3) UL (ref 4–11)

## 2020-02-04 PROCEDURE — 99204 OFFICE O/P NEW MOD 45 MIN: CPT | Performed by: FAMILY MEDICINE

## 2020-02-04 PROCEDURE — 84443 ASSAY THYROID STIM HORMONE: CPT

## 2020-02-04 PROCEDURE — 85025 COMPLETE CBC W/AUTO DIFF WBC: CPT

## 2020-02-04 PROCEDURE — 36415 COLL VENOUS BLD VENIPUNCTURE: CPT

## 2020-02-04 PROCEDURE — 83036 HEMOGLOBIN GLYCOSYLATED A1C: CPT

## 2020-02-04 PROCEDURE — 80048 BASIC METABOLIC PNL TOTAL CA: CPT

## 2020-02-04 RX ORDER — MINERAL OIL 100 MG/100ML
OIL ORAL; TOPICAL
Qty: 1 DEVICE | Refills: 0 | Status: SHIPPED | OUTPATIENT
Start: 2020-02-04

## 2020-02-04 RX ORDER — LISINOPRIL 10 MG/1
10 TABLET ORAL DAILY
Qty: 30 TABLET | Refills: 1 | Status: SHIPPED | OUTPATIENT
Start: 2020-02-04

## 2020-02-04 NOTE — PROGRESS NOTES
Patient presents with:  Blood Pressure: seen by GYN yesterday concerned w/ high bp  Headache: c/o headaches and blurry vision  Chest Pain: c/o left sided pinching pain      HPI:   Gonzalo Weir is a 44year old female who presents to clinic for blood press BASIC METABOLIC PANEL (8); Future  - lisinopril 10 MG Oral Tab; Take 1 tablet (10 mg total) by mouth daily. Dispense: 30 tablet; Refill: 1  - Blood Pressure Monitor Does not apply Device; Use daily. Dispense: 1 Device; Refill: 0    2.  Palpitations  - TSH

## 2020-02-05 LAB
GENITAL VAGINOSIS SCREEN: NEGATIVE
TRICHOMONAS SCREEN: NEGATIVE

## 2020-02-07 ENCOUNTER — TELEPHONE (OUTPATIENT)
Dept: OBGYN CLINIC | Facility: CLINIC | Age: 40
End: 2020-02-07

## 2020-02-07 NOTE — TELEPHONE ENCOUNTER
----- Message from Edgar Gross MD sent at 2/7/2020  9:26 AM CST -----  Please inform that vaginal culture confirms yeast infection. It should respond well to the Terazol vaginal cream that was prescribed.

## 2020-02-10 ENCOUNTER — OFFICE VISIT (OUTPATIENT)
Dept: FAMILY MEDICINE CLINIC | Facility: CLINIC | Age: 40
End: 2020-02-10
Payer: COMMERCIAL

## 2020-02-10 VITALS
HEART RATE: 101 BPM | OXYGEN SATURATION: 97 % | SYSTOLIC BLOOD PRESSURE: 127 MMHG | BODY MASS INDEX: 25.49 KG/M2 | DIASTOLIC BLOOD PRESSURE: 82 MMHG | TEMPERATURE: 104 F | WEIGHT: 135 LBS | HEIGHT: 61 IN

## 2020-02-10 DIAGNOSIS — J06.9 VIRAL URI WITH COUGH: Primary | ICD-10-CM

## 2020-02-10 PROBLEM — Z12.31 SCREENING MAMMOGRAM, ENCOUNTER FOR: Status: RESOLVED | Noted: 2018-08-18 | Resolved: 2020-02-10

## 2020-02-10 PROBLEM — Z87.891 FORMER SMOKER: Status: ACTIVE | Noted: 2019-06-14

## 2020-02-10 PROBLEM — Z83.3 FAMILY HISTORY OF DIABETES MELLITUS (DM): Status: ACTIVE | Noted: 2019-06-14

## 2020-02-10 PROBLEM — R03.0 ELEVATED BLOOD PRESSURE READING: Status: RESOLVED | Noted: 2020-02-03 | Resolved: 2020-02-10

## 2020-02-10 PROBLEM — K76.0 FATTY LIVER DISEASE, NONALCOHOLIC: Status: ACTIVE | Noted: 2020-02-10

## 2020-02-10 PROBLEM — Z90.711 HISTORY OF PARTIAL HYSTERECTOMY: Status: ACTIVE | Noted: 2019-06-14

## 2020-02-10 PROBLEM — Z92.89 HISTORY OF BLOOD TRANSFUSION: Status: ACTIVE | Noted: 2020-02-10

## 2020-02-10 PROBLEM — E55.9 VITAMIN D DEFICIENCY: Status: ACTIVE | Noted: 2018-02-13

## 2020-02-10 PROBLEM — E78.5 DYSLIPIDEMIA: Status: ACTIVE | Noted: 2018-02-13

## 2020-02-10 PROBLEM — Z83.79 FAMILY HISTORY OF FATTY LIVER: Status: ACTIVE | Noted: 2019-06-14

## 2020-02-10 PROBLEM — Z01.419 WELL WOMAN EXAM WITH ROUTINE GYNECOLOGICAL EXAM: Status: RESOLVED | Noted: 2017-07-25 | Resolved: 2020-02-10

## 2020-02-10 PROBLEM — E16.1 REACTIVE HYPOGLYCEMIA: Status: RESOLVED | Noted: 2018-08-18 | Resolved: 2020-02-10

## 2020-02-10 LAB
FLUAV + FLUBV RNA SPEC NAA+PROBE: NEGATIVE
FLUAV + FLUBV RNA SPEC NAA+PROBE: NEGATIVE
FLUAV + FLUBV RNA SPEC NAA+PROBE: POSITIVE

## 2020-02-10 PROCEDURE — 99213 OFFICE O/P EST LOW 20 MIN: CPT | Performed by: FAMILY MEDICINE

## 2020-02-10 RX ORDER — PROMETHAZINE HYDROCHLORIDE AND CODEINE PHOSPHATE 6.25; 1 MG/5ML; MG/5ML
5 SOLUTION ORAL NIGHTLY PRN
Qty: 100 ML | Refills: 0 | Status: SHIPPED | OUTPATIENT
Start: 2020-02-10

## 2020-02-10 RX ORDER — OSELTAMIVIR PHOSPHATE 75 MG/1
75 CAPSULE ORAL 2 TIMES DAILY
Qty: 10 CAPSULE | Refills: 0 | Status: SHIPPED | OUTPATIENT
Start: 2020-02-10 | End: 2020-02-15

## 2020-02-10 NOTE — PROGRESS NOTES
Patient presents with:  Cough: c/o constant cough and sore throat  Headache  Body ache and/or chills    HPI:   Shari Castillo is a 44year old female who presents to clinic with complaints of fever, chills, body ache, cough, sore throat and headache that st gargles. NSAIDS PRN for pain and fever.   - INFLUENZA A/B(FLU) AND RSV PCR; Future  - Oseltamivir Phosphate 75 MG Oral Cap; Take 1 capsule (75 mg total) by mouth 2 (two) times daily for 5 days. Dispense: 10 capsule;  Refill: 0  - Promethazine-Codeine 6.25-

## 2020-02-11 ENCOUNTER — PATIENT MESSAGE (OUTPATIENT)
Dept: FAMILY MEDICINE CLINIC | Facility: CLINIC | Age: 40
End: 2020-02-11

## 2020-02-11 ENCOUNTER — TELEPHONE (OUTPATIENT)
Dept: FAMILY MEDICINE CLINIC | Facility: CLINIC | Age: 40
End: 2020-02-11

## 2020-02-11 NOTE — TELEPHONE ENCOUNTER
Patient requesting an extended sick note to start work on Thursday and [facility] 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

## 2020-02-12 NOTE — TELEPHONE ENCOUNTER
Called patient to inquiry about her symptoms. Per patient, she has been having chills and feels hot but has not taken her temperature. Per patient, she takes Ibuprofen for her fever.    Patient also taking Promethazine-Codeine 6.25-10 MG/5ML Oral Soluti

## 2020-02-12 NOTE — TELEPHONE ENCOUNTER
No other recommendations aside from what I already discussed with her in the office, she should continue Tamiflu and ibuprofen for fever.   Thank you

## 2020-05-28 ENCOUNTER — OFFICE VISIT (OUTPATIENT)
Dept: FAMILY MEDICINE CLINIC | Facility: CLINIC | Age: 40
End: 2020-05-28
Payer: COMMERCIAL

## 2020-05-28 VITALS
HEART RATE: 65 BPM | DIASTOLIC BLOOD PRESSURE: 79 MMHG | BODY MASS INDEX: 23.41 KG/M2 | HEIGHT: 61 IN | SYSTOLIC BLOOD PRESSURE: 127 MMHG | TEMPERATURE: 99 F | WEIGHT: 124 LBS

## 2020-05-28 DIAGNOSIS — K59.00 CONSTIPATION, UNSPECIFIED CONSTIPATION TYPE: ICD-10-CM

## 2020-05-28 DIAGNOSIS — Z00.00 ANNUAL PHYSICAL EXAM: Primary | ICD-10-CM

## 2020-05-28 DIAGNOSIS — Z91.018 FOOD ALLERGY: ICD-10-CM

## 2020-05-28 PROCEDURE — 99395 PREV VISIT EST AGE 18-39: CPT | Performed by: FAMILY MEDICINE

## 2020-05-28 RX ORDER — ASPIRIN 81 MG
100 TABLET, DELAYED RELEASE (ENTERIC COATED) ORAL 2 TIMES DAILY
Qty: 30 TABLET | Refills: 2 | Status: SHIPPED | OUTPATIENT
Start: 2020-05-28

## 2020-05-28 NOTE — PROGRESS NOTES
HPI:   Rahul Thompson is a 44year old female who presents for a complete physical exam.    Work: Works as a teacher.   Also recent graduate from her masters program.  Social: Lives with  and children  Diet: eats home cooked meals, starting to count h tablet 3   • Loratadine-Pseudoephedrine ER 5-120 MG Oral Tablet 12 Hr Take 1 tablet by mouth every 12 (twelve) hours.  (Patient not taking: Reported on 2/4/2020 ) 60 tablet 3      Past Medical History:   Diagnosis Date   • Abnormal uterine bleeding (AUB) 5/ EOMI,conjunctiva are clear  NECK: supple, no adenopathy  LUNGS: clear to auscultation  CARDIO: RRR without murmur  GI: good BS, no masses or tenderness  MUSCULOSKELETAL: back is not tender  EXTREMITIES: no cyanosis or edema  NEURO: Oriented times three, cr

## 2020-09-15 ENCOUNTER — TELEMEDICINE (OUTPATIENT)
Dept: FAMILY MEDICINE CLINIC | Facility: CLINIC | Age: 40
End: 2020-09-15
Payer: COMMERCIAL

## 2020-09-15 ENCOUNTER — NURSE TRIAGE (OUTPATIENT)
Dept: FAMILY MEDICINE CLINIC | Facility: CLINIC | Age: 40
End: 2020-09-15

## 2020-09-15 DIAGNOSIS — J39.9 UPPER RESPIRATORY DISEASE: Primary | ICD-10-CM

## 2020-09-15 PROCEDURE — 99213 OFFICE O/P EST LOW 20 MIN: CPT | Performed by: PHYSICIAN ASSISTANT

## 2020-09-15 NOTE — TELEPHONE ENCOUNTER
Action Requested: Summary for Provider     []  Critical Lab, Recommendations Needed  [] Need Additional Advice  []   FYI    []   Need Orders  [] Need Medications Sent to Pharmacy  []  Other     SUMMARY: pt states that while at school today (she is a teac

## 2020-09-16 NOTE — PROGRESS NOTES
HPI: HPI    I spoke Adam Afghan by telephone , verified date of birth, and discussed current concerns. Onset/duration of current symptoms: today.     Common COVID-19 symptoms per CDC: CDC Clinical Guidance  • Fever:     Yes []     No [x]       • Coug due on 10/01/2020  Annual Depression Screen due on 02/03/2021  Annual Physical due on 05/28/2021    Patient's last menstrual period was 06/29/2016.    Past Medical History:     Past Medical History:   Diagnosis Date   • Abnormal uterine bleeding (AUB) 5/20/ club or organization: Not on file        Attends meetings of clubs or organizations: Not on file        Relationship status: Not on file      Intimate partner violence:        Fear of current or ex partner: Not on file        Emotionally abused: Not on gabby -  Primary    Relevant Orders    SARS-COV-2 BY PCR ()      Supportive care discussed with patient. Advise patient to proceed to ER if SOB or difficult breathing or severe headache. Discussed plan of care with pt and pt is in agreement. All quest

## 2020-09-17 ENCOUNTER — TELEPHONE (OUTPATIENT)
Dept: FAMILY MEDICINE CLINIC | Facility: CLINIC | Age: 40
End: 2020-09-17

## 2020-09-17 NOTE — TELEPHONE ENCOUNTER
New work note reflecting 10-day quarantine sent to patient via [address] Will discuss further at telemedicine visit.

## 2020-09-17 NOTE — TELEPHONE ENCOUNTER
Advised patient of Dr. Estrellita Chauhan note. Patient verbalized understanding. Virtual ppointment scheduled for Saturday at 10:00 am with .        Patient states she just got off the phone with a nurse from immediate care where she was tested and was told t

## 2020-09-17 NOTE — TELEPHONE ENCOUNTER
Work note sent to patient via [employer]. Please inform patient that I would like her to follow-up with me via telemedicine visit this Saturday ok to use my 9:15 or 10 am res 24.     If she is still symptomatic over the weekend would probably

## 2020-09-17 NOTE — TELEPHONE ENCOUNTER
Patient had telemedicine visit 09/15. Patient had rapid covid test done today. She tested negative for COVID at [facility]. Per patient, she has a bad headache and vomited on Tuesday    Today headache is not present.  Ayah Longchild

## 2020-09-19 ENCOUNTER — VIRTUAL PHONE E/M (OUTPATIENT)
Dept: FAMILY MEDICINE CLINIC | Facility: CLINIC | Age: 40
End: 2020-09-19
Payer: COMMERCIAL

## 2020-09-19 DIAGNOSIS — J06.9 VIRAL URI: Primary | ICD-10-CM

## 2020-09-19 PROCEDURE — 99213 OFFICE O/P EST LOW 20 MIN: CPT | Performed by: FAMILY MEDICINE

## 2020-09-19 NOTE — PROGRESS NOTES
Virtual Telephone Check-In    Ethan Rosado verbally consents to a Virtual/Telephone Check-In service on 09/19/20. Patient understands and accepts financial responsibility for any deductible, co-insurance and/or co-pays associated with this service.

## 2021-02-01 DIAGNOSIS — Z23 NEED FOR VACCINATION: ICD-10-CM

## 2021-02-25 ENCOUNTER — OFFICE VISIT (OUTPATIENT)
Dept: FAMILY MEDICINE CLINIC | Facility: CLINIC | Age: 41
End: 2021-02-25
Payer: COMMERCIAL

## 2021-02-25 VITALS
SYSTOLIC BLOOD PRESSURE: 126 MMHG | BODY MASS INDEX: 24.73 KG/M2 | WEIGHT: 131 LBS | HEART RATE: 60 BPM | HEIGHT: 61 IN | DIASTOLIC BLOOD PRESSURE: 82 MMHG | TEMPERATURE: 97 F

## 2021-02-25 DIAGNOSIS — Z87.19 HISTORY OF FATTY INFILTRATION OF LIVER: Primary | ICD-10-CM

## 2021-02-25 DIAGNOSIS — E78.5 DYSLIPIDEMIA: ICD-10-CM

## 2021-02-25 DIAGNOSIS — K75.81 STEATOHEPATITIS: ICD-10-CM

## 2021-02-25 DIAGNOSIS — L29.9 ITCHING: ICD-10-CM

## 2021-02-25 PROCEDURE — 3074F SYST BP LT 130 MM HG: CPT | Performed by: FAMILY MEDICINE

## 2021-02-25 PROCEDURE — 3008F BODY MASS INDEX DOCD: CPT | Performed by: FAMILY MEDICINE

## 2021-02-25 PROCEDURE — 3079F DIAST BP 80-89 MM HG: CPT | Performed by: FAMILY MEDICINE

## 2021-02-25 PROCEDURE — 99214 OFFICE O/P EST MOD 30 MIN: CPT | Performed by: FAMILY MEDICINE

## 2021-02-25 NOTE — PROGRESS NOTES
Patient presents with:  Itchiness: c/o itchiness when eating greasy foods    HPI:   Ashok Muñoz is a 36year old female who presents to clinic with concerns regarding fatty liver.   Has been told in the past that she has fatty liver and previously abnorma PANEL (14); Future  - LIPID PANEL; Future     RTC if no improvement in symptoms. Red flags discussed to go to JACY Chilel MD  2/25/2021  4:39 PM

## 2021-06-09 ENCOUNTER — HOSPITAL ENCOUNTER (OUTPATIENT)
Dept: ULTRASOUND IMAGING | Age: 41
Discharge: HOME | End: 2021-06-09
Attending: FAMILY MEDICINE
Payer: COMMERCIAL

## 2021-06-09 ENCOUNTER — LAB ENCOUNTER (OUTPATIENT)
Dept: LAB | Age: 41
End: 2021-06-09
Attending: FAMILY MEDICINE
Payer: COMMERCIAL

## 2021-06-09 DIAGNOSIS — E78.5 DYSLIPIDEMIA: ICD-10-CM

## 2021-06-09 DIAGNOSIS — R30.0 DYSURIA: ICD-10-CM

## 2021-06-09 DIAGNOSIS — Z87.19 HISTORY OF FATTY INFILTRATION OF LIVER: ICD-10-CM

## 2021-06-09 DIAGNOSIS — L29.9 ITCHING: ICD-10-CM

## 2021-06-09 DIAGNOSIS — K75.81 STEATOHEPATITIS: ICD-10-CM

## 2021-06-09 PROCEDURE — 87086 URINE CULTURE/COLONY COUNT: CPT

## 2021-06-09 PROCEDURE — 81003 URINALYSIS AUTO W/O SCOPE: CPT

## 2021-06-09 PROCEDURE — 80061 LIPID PANEL: CPT

## 2021-06-09 PROCEDURE — 80053 COMPREHEN METABOLIC PANEL: CPT

## 2021-06-09 PROCEDURE — 36415 COLL VENOUS BLD VENIPUNCTURE: CPT

## 2021-06-09 PROCEDURE — 76700 US EXAM ABDOM COMPLETE: CPT | Performed by: FAMILY MEDICINE

## 2021-06-16 ENCOUNTER — PATIENT MESSAGE (OUTPATIENT)
Dept: FAMILY MEDICINE CLINIC | Facility: CLINIC | Age: 41
End: 2021-06-16

## 2021-06-16 DIAGNOSIS — K75.81 STEATOHEPATITIS: Primary | ICD-10-CM

## 2021-06-23 NOTE — ED INITIAL ASSESSMENT (HPI)
PT c/o rlq abd pain, ongoing, reports pain has worsened and has atraumatic bruise to site. Last BM today at 1600. Denies vomiting/diarrhea. Denies bleeding/discharge.
/

## 2021-07-19 ENCOUNTER — OFFICE VISIT (OUTPATIENT)
Dept: OBGYN CLINIC | Facility: CLINIC | Age: 41
End: 2021-07-19
Payer: COMMERCIAL

## 2021-07-19 VITALS
SYSTOLIC BLOOD PRESSURE: 129 MMHG | BODY MASS INDEX: 25.52 KG/M2 | HEIGHT: 60 IN | WEIGHT: 130 LBS | DIASTOLIC BLOOD PRESSURE: 72 MMHG

## 2021-07-19 DIAGNOSIS — B37.3 YEAST VAGINITIS: ICD-10-CM

## 2021-07-19 DIAGNOSIS — Z01.419 ENCOUNTER FOR WELL WOMAN EXAM WITH ROUTINE GYNECOLOGICAL EXAM: Primary | ICD-10-CM

## 2021-07-19 DIAGNOSIS — Z12.31 SCREENING MAMMOGRAM, ENCOUNTER FOR: ICD-10-CM

## 2021-07-19 DIAGNOSIS — N32.81 OAB (OVERACTIVE BLADDER): ICD-10-CM

## 2021-07-19 PROBLEM — B37.31 YEAST VAGINITIS: Status: ACTIVE | Noted: 2020-02-03

## 2021-07-19 PROCEDURE — 3078F DIAST BP <80 MM HG: CPT | Performed by: OBSTETRICS & GYNECOLOGY

## 2021-07-19 PROCEDURE — 3074F SYST BP LT 130 MM HG: CPT | Performed by: OBSTETRICS & GYNECOLOGY

## 2021-07-19 PROCEDURE — 99396 PREV VISIT EST AGE 40-64: CPT | Performed by: OBSTETRICS & GYNECOLOGY

## 2021-07-19 PROCEDURE — 3008F BODY MASS INDEX DOCD: CPT | Performed by: OBSTETRICS & GYNECOLOGY

## 2021-07-19 NOTE — PROGRESS NOTES
HPI:    Patient ID: Walter Mercedes is a 36year old year old female. HPI  Well woman visit  History of SEA in 2016:  DATE:  07/13/2016     PREOPERATIVE DIAGNOSES:  1. Severe menorrhagia. 2. Severe anemia. 3. Uterine fibroids. 4. Dysmenorrhea.      POST Reported on 2/10/2020 ) 1 Device 0   • Montelukast Sodium 10 MG Oral Tab Take 1 tablet (10 mg total) by mouth nightly.  (Patient not taking: Reported on 2/25/2021 ) 30 tablet 3   • Loratadine-Pseudoephedrine ER 5-120 MG Oral Tablet 12 Hr Take 1 tablet by mo PREP        COLLECTION        Normal exam.  Pap/HPV not indicated  Monthly self breast exam.  Annual screening mammograms. Screening colonoscopy at age 48, earlier if indicated. Recommend regular exercise and quality diet.   Return to clinic in one year o

## 2021-07-21 LAB
GENITAL VAGINOSIS SCREEN: NEGATIVE
TRICHOMONAS SCREEN: NEGATIVE

## 2021-08-05 ENCOUNTER — HOSPITAL ENCOUNTER (OUTPATIENT)
Dept: MAMMOGRAPHY | Age: 41
Discharge: HOME | End: 2021-08-05
Attending: OBSTETRICS & GYNECOLOGY
Payer: COMMERCIAL

## 2021-08-05 DIAGNOSIS — Z12.31 SCREENING MAMMOGRAM, ENCOUNTER FOR: ICD-10-CM

## 2021-08-05 PROCEDURE — 77067 SCR MAMMO BI INCL CAD: CPT | Performed by: OBSTETRICS & GYNECOLOGY

## 2021-08-05 PROCEDURE — 77063 BREAST TOMOSYNTHESIS BI: CPT | Performed by: OBSTETRICS & GYNECOLOGY

## 2021-08-06 ENCOUNTER — TELEPHONE (OUTPATIENT)
Dept: OBGYN CLINIC | Facility: CLINIC | Age: 41
End: 2021-08-06

## 2021-08-06 NOTE — TELEPHONE ENCOUNTER
Face-Me message sent to pt.    ----- Message from Nova Vega MD sent at 8/6/2021  1:19 PM CDT -----  Please notify of normal mammogram by either Face-Me or mail.

## 2021-12-10 ENCOUNTER — LAB ENCOUNTER (OUTPATIENT)
Dept: LAB | Age: 41
End: 2021-12-10
Attending: FAMILY MEDICINE
Payer: COMMERCIAL

## 2021-12-10 ENCOUNTER — OFFICE VISIT (OUTPATIENT)
Dept: FAMILY MEDICINE CLINIC | Facility: CLINIC | Age: 41
End: 2021-12-10
Payer: COMMERCIAL

## 2021-12-10 ENCOUNTER — HOSPITAL ENCOUNTER (OUTPATIENT)
Dept: GENERAL RADIOLOGY | Age: 41
Discharge: HOME | End: 2021-12-10
Attending: FAMILY MEDICINE
Payer: COMMERCIAL

## 2021-12-10 DIAGNOSIS — H02.843 PAIN AND SWELLING OF EYELIDS OF BOTH EYES: ICD-10-CM

## 2021-12-10 DIAGNOSIS — K76.0 FATTY LIVER: ICD-10-CM

## 2021-12-10 DIAGNOSIS — H02.89 PAIN AND SWELLING OF EYELIDS OF BOTH EYES: ICD-10-CM

## 2021-12-10 DIAGNOSIS — Z13.6 SCREENING FOR CARDIOVASCULAR CONDITION: ICD-10-CM

## 2021-12-10 DIAGNOSIS — H02.846 PAIN AND SWELLING OF EYELIDS OF BOTH EYES: ICD-10-CM

## 2021-12-10 DIAGNOSIS — R03.0 ELEVATED BLOOD PRESSURE READING: ICD-10-CM

## 2021-12-10 DIAGNOSIS — M25.512 ACUTE PAIN OF LEFT SHOULDER: ICD-10-CM

## 2021-12-10 DIAGNOSIS — M25.512 ACUTE PAIN OF LEFT SHOULDER: Primary | ICD-10-CM

## 2021-12-10 PROCEDURE — 86003 ALLG SPEC IGE CRUDE XTRC EA: CPT

## 2021-12-10 PROCEDURE — 80076 HEPATIC FUNCTION PANEL: CPT

## 2021-12-10 PROCEDURE — 36415 COLL VENOUS BLD VENIPUNCTURE: CPT

## 2021-12-10 PROCEDURE — 73030 X-RAY EXAM OF SHOULDER: CPT | Performed by: FAMILY MEDICINE

## 2021-12-10 PROCEDURE — 99214 OFFICE O/P EST MOD 30 MIN: CPT | Performed by: FAMILY MEDICINE

## 2021-12-10 PROCEDURE — 3008F BODY MASS INDEX DOCD: CPT | Performed by: FAMILY MEDICINE

## 2021-12-10 PROCEDURE — 3079F DIAST BP 80-89 MM HG: CPT | Performed by: FAMILY MEDICINE

## 2021-12-10 PROCEDURE — 82785 ASSAY OF IGE: CPT

## 2021-12-10 PROCEDURE — 3077F SYST BP >= 140 MM HG: CPT | Performed by: FAMILY MEDICINE

## 2021-12-10 NOTE — PROGRESS NOTES
Patient presents with:  Referral: allergy testing  Shoulder Pain: c/o left shoulder pain    HPI:   Glenis Way is a 39year old female who presents to clinic for follow-up. Incidental finding on imaging showed hepatic steatosis.   Patient has a strong fa pressure monitor and follow-up for blood pressures above 140 over above 90. Blood pressure elevation likely due to anxiety. RTC if no improvement in symptoms. Red flags discussed to go to ER.      Mikayla Davies MD  12/10/2021  4:06 PM

## 2021-12-12 VITALS
HEIGHT: 60 IN | DIASTOLIC BLOOD PRESSURE: 88 MMHG | BODY MASS INDEX: 25.91 KG/M2 | SYSTOLIC BLOOD PRESSURE: 145 MMHG | WEIGHT: 132 LBS | HEART RATE: 62 BPM | TEMPERATURE: 98 F

## 2021-12-17 ENCOUNTER — OFFICE VISIT (OUTPATIENT)
Dept: FAMILY MEDICINE CLINIC | Facility: CLINIC | Age: 41
End: 2021-12-17
Payer: COMMERCIAL

## 2021-12-17 VITALS
TEMPERATURE: 98 F | HEART RATE: 62 BPM | HEIGHT: 60 IN | SYSTOLIC BLOOD PRESSURE: 153 MMHG | DIASTOLIC BLOOD PRESSURE: 101 MMHG | BODY MASS INDEX: 25.91 KG/M2 | WEIGHT: 132 LBS

## 2021-12-17 DIAGNOSIS — R03.0 ELEVATED BLOOD PRESSURE READING: Primary | ICD-10-CM

## 2021-12-17 DIAGNOSIS — N64.4 MASTALGIA: ICD-10-CM

## 2021-12-17 PROCEDURE — 3008F BODY MASS INDEX DOCD: CPT | Performed by: FAMILY MEDICINE

## 2021-12-17 PROCEDURE — 3077F SYST BP >= 140 MM HG: CPT | Performed by: FAMILY MEDICINE

## 2021-12-17 PROCEDURE — 3080F DIAST BP >= 90 MM HG: CPT | Performed by: FAMILY MEDICINE

## 2021-12-17 PROCEDURE — 99213 OFFICE O/P EST LOW 20 MIN: CPT | Performed by: FAMILY MEDICINE

## 2021-12-17 NOTE — PROGRESS NOTES
Patient presents with:  Mass: concerned w/ mass on right breast    HPI:   Glenis Way is a 39year old female who presents to clinic with complaints of left breast pain after going on a run with a nonsupportive bra last week.   Also had some associated re

## 2022-03-11 ENCOUNTER — PATIENT MESSAGE (OUTPATIENT)
Dept: FAMILY MEDICINE CLINIC | Facility: CLINIC | Age: 42
End: 2022-03-11

## 2022-03-12 NOTE — TELEPHONE ENCOUNTER
From: Jeanella Kanner  To: Soraya Hitchcock MD  Sent: 3/11/2022 9:26 PM CST  Subject: Urine infection     Hello Dr. Jazmine Hagen, I believe I may have a urine infection. I noticed it yesterday when I was urinating a lot throughout the day. Then, I felt pain and burning (but only once). It made me dehydrated. However, I continue to urinate constantly.      Tarsha Montoya

## 2022-03-14 ENCOUNTER — LAB ENCOUNTER (OUTPATIENT)
Dept: LAB | Age: 42
End: 2022-03-14
Attending: FAMILY MEDICINE
Payer: COMMERCIAL

## 2022-03-14 ENCOUNTER — TELEMEDICINE (OUTPATIENT)
Dept: FAMILY MEDICINE CLINIC | Facility: CLINIC | Age: 42
End: 2022-03-14
Payer: COMMERCIAL

## 2022-03-14 DIAGNOSIS — N30.00 ACUTE CYSTITIS WITHOUT HEMATURIA: Primary | ICD-10-CM

## 2022-03-14 DIAGNOSIS — N30.00 ACUTE CYSTITIS WITHOUT HEMATURIA: ICD-10-CM

## 2022-03-14 PROCEDURE — 99213 OFFICE O/P EST LOW 20 MIN: CPT | Performed by: FAMILY MEDICINE

## 2022-03-14 PROCEDURE — 87086 URINE CULTURE/COLONY COUNT: CPT

## 2022-03-14 RX ORDER — NITROFURANTOIN 25; 75 MG/1; MG/1
100 CAPSULE ORAL 2 TIMES DAILY
Qty: 10 CAPSULE | Refills: 0 | Status: SHIPPED | OUTPATIENT
Start: 2022-03-14 | End: 2022-03-19

## 2022-03-14 NOTE — PROGRESS NOTES
Virtual Telephone Check-In    Etelvina Brewer verbally consents to a Virtual/Telephone Check-In service on 03/14/22. Patient understands and accepts financial responsibility for any deductible, co-insurance and/or co-pays associated with this service. Duration of the service: 15 minutes  This telemedicine visit was conducted using live audio and video. Summary of topics discussed:   Got tested at [facility] with UA strip and + for UTI. Taking AZO. Going to urinate 30 times a day. No dysuria or hematuria. Physical Exam:  General: alert, speaking in full sentences, no acute distress  Lungs: respirations sound unlabored, no audible wheezing with speaking. Neurologic: alert, oriented x3    Assessment and plan:     Acute cystitis without hematuria  - URINE CULTURE, ROUTINE; Future  - nitrofurantoin monohydrate macro 100 MG Oral Cap; Take 1 capsule (100 mg total) by mouth 2 (two) times daily for 5 days. Dispense: 10 capsule; Refill: 0      Advised to call back clinic if no improvement in symptoms. Red flags discussed to go to ER.      Michael Alicea MD

## 2022-03-17 ENCOUNTER — OFFICE VISIT (OUTPATIENT)
Dept: OBGYN CLINIC | Facility: CLINIC | Age: 42
End: 2022-03-17
Payer: COMMERCIAL

## 2022-03-17 VITALS
DIASTOLIC BLOOD PRESSURE: 104 MMHG | BODY MASS INDEX: 25 KG/M2 | WEIGHT: 130.38 LBS | SYSTOLIC BLOOD PRESSURE: 156 MMHG | HEART RATE: 71 BPM

## 2022-03-17 DIAGNOSIS — R39.9 UTI SYMPTOMS: Primary | ICD-10-CM

## 2022-03-17 DIAGNOSIS — R03.0 ELEVATED BP WITHOUT DIAGNOSIS OF HYPERTENSION: ICD-10-CM

## 2022-03-17 LAB
APPEARANCE: CLEAR
BILIRUBIN: NEGATIVE
GLUCOSE (URINE DIPSTICK): NEGATIVE MG/DL
KETONES (URINE DIPSTICK): NEGATIVE MG/DL
MULTISTIX LOT#: ABNORMAL NUMERIC
NITRITE, URINE: NEGATIVE
OCCULT BLOOD: NEGATIVE
PH, URINE: 6.5 (ref 4.5–8)
PROTEIN (URINE DIPSTICK): NEGATIVE MG/DL
SPECIFIC GRAVITY: 1.01 (ref 1–1.03)
URINE-COLOR: YELLOW
UROBILINOGEN,SEMI-QN: 0.2 MG/DL (ref 0–1.9)

## 2022-03-17 PROCEDURE — 3080F DIAST BP >= 90 MM HG: CPT | Performed by: OBSTETRICS & GYNECOLOGY

## 2022-03-17 PROCEDURE — 99213 OFFICE O/P EST LOW 20 MIN: CPT | Performed by: OBSTETRICS & GYNECOLOGY

## 2022-03-17 PROCEDURE — 3077F SYST BP >= 140 MM HG: CPT | Performed by: OBSTETRICS & GYNECOLOGY

## 2022-03-17 PROCEDURE — 81003 URINALYSIS AUTO W/O SCOPE: CPT | Performed by: OBSTETRICS & GYNECOLOGY

## 2022-03-17 RX ORDER — FLUCONAZOLE 150 MG/1
TABLET ORAL
Qty: 2 TABLET | Refills: 2 | Status: SHIPPED | OUTPATIENT
Start: 2022-03-17

## 2022-04-07 ENCOUNTER — OFFICE VISIT (OUTPATIENT)
Dept: SURGERY | Facility: CLINIC | Age: 42
End: 2022-04-07
Payer: COMMERCIAL

## 2022-04-07 ENCOUNTER — TELEPHONE (OUTPATIENT)
Dept: SURGERY | Facility: CLINIC | Age: 42
End: 2022-04-07

## 2022-04-07 DIAGNOSIS — Z83.3 FAMILY HISTORY OF DIABETES MELLITUS (DM): ICD-10-CM

## 2022-04-07 DIAGNOSIS — N39.3 STRESS INCONTINENCE: ICD-10-CM

## 2022-04-07 DIAGNOSIS — R82.90 URINE FINDING: ICD-10-CM

## 2022-04-07 DIAGNOSIS — B37.9 YEAST INFECTION: ICD-10-CM

## 2022-04-07 DIAGNOSIS — R35.0 URINARY FREQUENCY: Primary | ICD-10-CM

## 2022-04-07 LAB
BILIRUBIN: NEGATIVE
GLUCOSE (URINE DIPSTICK): NEGATIVE MG/DL
KETONES (URINE DIPSTICK): NEGATIVE MG/DL
LEUKOCYTES: NEGATIVE
MULTISTIX LOT#: NORMAL NUMERIC
NITRITE, URINE: NEGATIVE
OCCULT BLOOD: NEGATIVE
PH, URINE: 7 (ref 4.5–8)
PROTEIN (URINE DIPSTICK): NEGATIVE MG/DL
SPECIFIC GRAVITY: 1.01 (ref 1–1.03)
URINE-COLOR: YELLOW
UROBILINOGEN,SEMI-QN: 0.2 MG/DL (ref 0–1.9)

## 2022-04-07 PROCEDURE — 81003 URINALYSIS AUTO W/O SCOPE: CPT | Performed by: PHYSICIAN ASSISTANT

## 2022-04-07 PROCEDURE — 99243 OFF/OP CNSLTJ NEW/EST LOW 30: CPT | Performed by: PHYSICIAN ASSISTANT

## 2022-04-07 NOTE — PATIENT INSTRUCTIONS
Dietary Irritants    What you can do to help relieve your symptoms:    The purpose of this handout is to provide information that can help you discover what you are ingesting or doing that may be contributing to your recurrent irritative voiding symptoms and what steps you can take to relieve your discomfort. Frequency, urgency, and pain on urination are symptoms of inflammation or irritation. These symptoms can be caused by bacterial infections or substances in the urine causing irritation to the lining of the bladder or urethra. Bacterial infections can be treated with antibiotics. But irritation of the bladder or urethra can results from other cause that will probably not respond to antibiotics. What to do at the first sign of bladder or urethral discomfort: The basic treatment for irritable bladder symptoms, whether induced by bacterial or nonbacterial irritants, is hydration. At the first sign of discomfort, start drinking 16 oz of water mixed with 1 teaspoon of baking soda. Then drink 8 oz of plain water every 20 minutes for the next 2-3 hours. Repeat drinking 16 oz of water with baking soda in 3-4 hours. (Baking soda contains sodium and can cause fluid retention. If you have a history of high blood pressure, congestive heart failure, or renal disease, you should not use more than twice in 24 hours.)    You can take acetaminophen to relieve the pain (two extra strength or three regular strength tablets). Bladder analgesics such as phenazopyridine (Pyridium) may help and will not alter the results of a urine culture should the symptoms not be significantly relieved by diluting the urine and flushing out the bladder. A warm bath to help muscles of the pelvic floor relax will also help lessen discomfort. Before starting any antibiotic, a urine culture must be taken. If the conservative measures mentioned above are not helping, call the office to arrange for a urine culture.  If the specimen is contaminated with vaginal cells and bacteria and you are severely symptomatic, then a catheterized specimen should be obtained directly from your bladder to determine precisely whether bacterial infection is the cause of your symptoms. Dietary irritants to the urinary tract to avoid:    Certain food can contribute to urinary frequency, urgency, and discomfort. If bladder symptoms are related to dietary factors, strict adherence to a diet that eliminated the food should bring significant relief in 10 days. Once you are feeling better, you can begin to add foods back into your diet one at a time. If the symptoms return, you will be able to identify the irritant. As you add foods back to your diet it is very important that you drink significant amounts of water. These foods are acidic and are considered irritants to the bladder. They should be avoided. Alcoholic beverages  Apples and apple juice  Cantaloupe  Carbonated beverages  Chili and spicy foods  Citrus fruit  Chocolate  Coffee (including decaf)  Cranberries and juice  Grapes  Guava  Peaches  Pineapple  Plums  Strawberries  Sugar*  Tea  Tomatoes  Vit B Complex  Vinegar  *Some women report that sugar flares their symptoms. Low acid fruit substitutions include apricots, papaya, pears, and watermelon. Coffee drinkers can drink Kava or other low-acid instant drinks. Tea drinks can substitute non-citrus herbal and sun brewed teas.  Calcium carbonate co-buffered with calcium ascorbate can be substituted for Vitamin C.

## 2022-04-07 NOTE — TELEPHONE ENCOUNTER
Patient states she was in today and diabetes blood work order were suppose to be put in.  Please advise

## 2022-04-27 ENCOUNTER — LAB ENCOUNTER (OUTPATIENT)
Dept: LAB | Facility: HOSPITAL | Age: 42
End: 2022-04-27
Attending: PHYSICIAN ASSISTANT
Payer: COMMERCIAL

## 2022-04-27 ENCOUNTER — HOSPITAL ENCOUNTER (OUTPATIENT)
Dept: ULTRASOUND IMAGING | Facility: HOSPITAL | Age: 42
Discharge: HOME | End: 2022-04-27
Attending: PHYSICIAN ASSISTANT
Payer: COMMERCIAL

## 2022-04-27 DIAGNOSIS — B37.9 INFECTION DUE TO CANDIDA SPECIES: ICD-10-CM

## 2022-04-27 DIAGNOSIS — Z83.3 FAMILY HISTORY OF DIABETES MELLITUS (DM): ICD-10-CM

## 2022-04-27 DIAGNOSIS — Z83.3 FAMILY HISTORY OF DIABETES MELLITUS: Primary | ICD-10-CM

## 2022-04-27 DIAGNOSIS — R82.90 URINE FINDING: ICD-10-CM

## 2022-04-27 DIAGNOSIS — R35.0 URINARY FREQUENCY: ICD-10-CM

## 2022-04-27 DIAGNOSIS — B37.9 YEAST INFECTION: ICD-10-CM

## 2022-04-27 LAB
EST. AVERAGE GLUCOSE BLD GHB EST-MCNC: 105 MG/DL (ref 68–126)
HBA1C MFR BLD: 5.3 % (ref ?–5.7)

## 2022-04-27 PROCEDURE — 76770 US EXAM ABDO BACK WALL COMP: CPT | Performed by: PHYSICIAN ASSISTANT

## 2022-04-27 PROCEDURE — 83036 HEMOGLOBIN GLYCOSYLATED A1C: CPT

## 2022-04-27 PROCEDURE — 36415 COLL VENOUS BLD VENIPUNCTURE: CPT

## 2022-08-05 ENCOUNTER — TELEPHONE (OUTPATIENT)
Dept: GASTROENTEROLOGY | Facility: CLINIC | Age: 42
End: 2022-08-05

## 2022-08-05 ENCOUNTER — OFFICE VISIT (OUTPATIENT)
Dept: GASTROENTEROLOGY | Facility: CLINIC | Age: 42
End: 2022-08-05

## 2022-08-05 VITALS
HEART RATE: 69 BPM | WEIGHT: 129 LBS | HEIGHT: 60 IN | DIASTOLIC BLOOD PRESSURE: 93 MMHG | SYSTOLIC BLOOD PRESSURE: 145 MMHG | BODY MASS INDEX: 25.32 KG/M2

## 2022-08-05 DIAGNOSIS — R10.13 DYSPEPSIA: ICD-10-CM

## 2022-08-05 DIAGNOSIS — R14.0 ABDOMINAL BLOATING: Primary | ICD-10-CM

## 2022-08-05 DIAGNOSIS — R10.31 RLQ ABDOMINAL PAIN: ICD-10-CM

## 2022-08-05 DIAGNOSIS — K21.9 GASTROESOPHAGEAL REFLUX DISEASE, UNSPECIFIED WHETHER ESOPHAGITIS PRESENT: Primary | ICD-10-CM

## 2022-08-05 DIAGNOSIS — R19.4 CHANGE IN BOWEL HABITS: ICD-10-CM

## 2022-08-05 DIAGNOSIS — R14.0 BLOATING: ICD-10-CM

## 2022-08-05 DIAGNOSIS — K76.0 FATTY LIVER DISEASE, NONALCOHOLIC: ICD-10-CM

## 2022-08-05 DIAGNOSIS — Z83.79 FAMILY HISTORY OF FATTY LIVER: ICD-10-CM

## 2022-08-05 DIAGNOSIS — K59.00 CONSTIPATION, UNSPECIFIED CONSTIPATION TYPE: ICD-10-CM

## 2022-08-05 PROCEDURE — 3080F DIAST BP >= 90 MM HG: CPT | Performed by: INTERNAL MEDICINE

## 2022-08-05 PROCEDURE — 99244 OFF/OP CNSLTJ NEW/EST MOD 40: CPT | Performed by: INTERNAL MEDICINE

## 2022-08-05 PROCEDURE — 3008F BODY MASS INDEX DOCD: CPT | Performed by: INTERNAL MEDICINE

## 2022-08-05 PROCEDURE — 3077F SYST BP >= 140 MM HG: CPT | Performed by: INTERNAL MEDICINE

## 2022-08-05 RX ORDER — SODIUM PICOSULFATE, MAGNESIUM OXIDE, AND ANHYDROUS CITRIC ACID 10; 3.5; 12 MG/160ML; G/160ML; G/160ML
1 LIQUID ORAL ONCE
Qty: 160 ML | Refills: 0 | Status: SHIPPED | OUTPATIENT
Start: 2022-08-05 | End: 2022-08-05

## 2022-08-05 NOTE — TELEPHONE ENCOUNTER
Scheduled for:  Colonoscopy [phone] ,[address]  Provider Name:  Mavis Burt  Date:  09/12/2022  Location:  [facility]  Sedation:  Mac   Time:  12:30 Pm (pt is aware to arrive at 11:30 Am )   Prep: Clenpiq or Golytely  Prep instructions were given to pt in the office, pt verbalized understanding. Meds/Allergies Reconciled?:  Physician reviewed     Diagnosis with codes:  Eli Deutscher k21.9 , dyspepsia R10.13, Abdominal bloating R14.0 ,changed in bowel habits R19.4,RLQ pain -R10.32  Was patient informed to call insurance with codes (Y/N):  Yes, I confirmed [employer] [place] insurance with the patient. The patient also verbally understands to call her insurance to check for pre-cert, codes were given on prep instructions. Referral sent?:  N/a  [facility] or [facility] notified?:  I sent an electronic request to Endo Scheduling and received a confirmation today. Medication Orders: This patient verbally confirmed that she  is not taking:   Iron, blood thinners, BP meds, and is not diabetic   Not latex allergy, Not PCN allergy and does not have a pacemaker Pt is aware to NOT take iron pills, herbal meds and diet supplements for 7 days before exam. Also to NOT take any form of alcohol, recreational drugs and any forms of ED meds 24 hours before exam.    Misc Orders:  Patient was informed that they will need a COVID 19 test prior to their procedure. Patient verbally understood & will await a phone call from [facility] to schedule.       Further instructions given by staff:

## 2022-09-30 ENCOUNTER — APPOINTMENT (OUTPATIENT)
Dept: LAB | Age: 42
End: 2022-09-30
Attending: INTERNAL MEDICINE
Payer: COMMERCIAL

## 2022-09-30 ENCOUNTER — LAB ENCOUNTER (OUTPATIENT)
Dept: LAB | Age: 42
End: 2022-09-30
Attending: INTERNAL MEDICINE
Payer: COMMERCIAL

## 2022-09-30 DIAGNOSIS — Z01.818 PRE-OP TESTING: ICD-10-CM

## 2022-10-01 LAB — SARS-COV-2 RNA RESP QL NAA+PROBE: NOT DETECTED

## 2022-10-03 ENCOUNTER — ANESTHESIA (OUTPATIENT)
Dept: ENDOSCOPY | Facility: HOSPITAL | Age: 42
End: 2022-10-03
Payer: COMMERCIAL

## 2022-10-03 ENCOUNTER — HOSPITAL ENCOUNTER (OUTPATIENT)
Facility: HOSPITAL | Age: 42
Setting detail: HOSPITAL OUTPATIENT SURGERY
Discharge: HOME | End: 2022-10-03
Attending: INTERNAL MEDICINE | Admitting: INTERNAL MEDICINE
Payer: COMMERCIAL

## 2022-10-03 ENCOUNTER — ANESTHESIA EVENT (OUTPATIENT)
Dept: ENDOSCOPY | Facility: HOSPITAL | Age: 42
End: 2022-10-03
Payer: COMMERCIAL

## 2022-10-03 VITALS
RESPIRATION RATE: 16 BRPM | OXYGEN SATURATION: 100 % | DIASTOLIC BLOOD PRESSURE: 66 MMHG | HEART RATE: 63 BPM | BODY MASS INDEX: 24.74 KG/M2 | SYSTOLIC BLOOD PRESSURE: 115 MMHG | TEMPERATURE: 99 F | WEIGHT: 126 LBS | HEIGHT: 60 IN

## 2022-10-03 DIAGNOSIS — R14.0 BLOATING: ICD-10-CM

## 2022-10-03 DIAGNOSIS — R19.4 CHANGE IN BOWEL HABITS: ICD-10-CM

## 2022-10-03 DIAGNOSIS — Z01.818 PRE-OP TESTING: Primary | ICD-10-CM

## 2022-10-03 DIAGNOSIS — R10.13 DYSPEPSIA: ICD-10-CM

## 2022-10-03 DIAGNOSIS — R10.31 RLQ ABDOMINAL PAIN: ICD-10-CM

## 2022-10-03 DIAGNOSIS — K21.9 GASTROESOPHAGEAL REFLUX DISEASE, UNSPECIFIED WHETHER ESOPHAGITIS PRESENT: ICD-10-CM

## 2022-10-03 PROCEDURE — 0DB98ZZ EXCISION OF DUODENUM, VIA NATURAL OR ARTIFICIAL OPENING ENDOSCOPIC: ICD-10-PCS | Performed by: INTERNAL MEDICINE

## 2022-10-03 PROCEDURE — 43239 EGD BIOPSY SINGLE/MULTIPLE: CPT | Performed by: INTERNAL MEDICINE

## 2022-10-03 PROCEDURE — 45378 DIAGNOSTIC COLONOSCOPY: CPT | Performed by: INTERNAL MEDICINE

## 2022-10-03 PROCEDURE — 0DJD8ZZ INSPECTION OF LOWER INTESTINAL TRACT, VIA NATURAL OR ARTIFICIAL OPENING ENDOSCOPIC: ICD-10-PCS | Performed by: INTERNAL MEDICINE

## 2022-10-03 PROCEDURE — 0DB78ZX EXCISION OF STOMACH, PYLORUS, VIA NATURAL OR ARTIFICIAL OPENING ENDOSCOPIC, DIAGNOSTIC: ICD-10-PCS | Performed by: INTERNAL MEDICINE

## 2022-10-03 RX ORDER — NALOXONE HYDROCHLORIDE 0.4 MG/ML
80 INJECTION, SOLUTION INTRAMUSCULAR; INTRAVENOUS; SUBCUTANEOUS AS NEEDED
Status: DISCONTINUED | OUTPATIENT
Start: 2022-10-03 | End: 2022-10-03

## 2022-10-03 RX ORDER — SODIUM CHLORIDE, SODIUM LACTATE, POTASSIUM CHLORIDE, CALCIUM CHLORIDE 600; 310; 30; 20 MG/100ML; MG/100ML; MG/100ML; MG/100ML
INJECTION, SOLUTION INTRAVENOUS CONTINUOUS
Status: DISCONTINUED | OUTPATIENT
Start: 2022-10-03 | End: 2022-10-03

## 2022-10-03 RX ORDER — SODIUM CHLORIDE, SODIUM LACTATE, POTASSIUM CHLORIDE, CALCIUM CHLORIDE 600; 310; 30; 20 MG/100ML; MG/100ML; MG/100ML; MG/100ML
INJECTION, SOLUTION INTRAVENOUS CONTINUOUS PRN
Status: DISCONTINUED | OUTPATIENT
Start: 2022-10-03 | End: 2022-10-03 | Stop reason: SURG

## 2022-10-03 RX ORDER — LIDOCAINE HYDROCHLORIDE 10 MG/ML
INJECTION, SOLUTION EPIDURAL; INFILTRATION; INTRACAUDAL; PERINEURAL AS NEEDED
Status: DISCONTINUED | OUTPATIENT
Start: 2022-10-03 | End: 2022-10-03 | Stop reason: SURG

## 2022-10-03 RX ADMIN — SODIUM CHLORIDE, SODIUM LACTATE, POTASSIUM CHLORIDE, CALCIUM CHLORIDE: 600; 310; 30; 20 INJECTION, SOLUTION INTRAVENOUS at 13:24:00

## 2022-10-03 RX ADMIN — SODIUM CHLORIDE, SODIUM LACTATE, POTASSIUM CHLORIDE, CALCIUM CHLORIDE: 600; 310; 30; 20 INJECTION, SOLUTION INTRAVENOUS at 12:52:00

## 2022-10-03 RX ADMIN — LIDOCAINE HYDROCHLORIDE 50 MG: 10 INJECTION, SOLUTION EPIDURAL; INFILTRATION; INTRACAUDAL; PERINEURAL at 12:50:00

## 2022-10-03 NOTE — ANESTHESIA POSTPROCEDURE EVALUATION
Patient: Yesica Duran    Procedure Summary     Date: 10/03/22 Room / Location: [address] [place] [address] ENDOSCOPY 04 / [address] [place] [address] ENDOSCOPY    Anesthesia Start: 1230 Anesthesia Stop: 7145    Procedures:       COLONOSCOPY (N/A )      ESOPHAGOGASTRODUODENOSCOPY (EGD) (N/A ) Diagnosis:       Gastroesophageal reflux disease, unspecified whether esophagitis present      Dyspepsia      Change in bowel habits      Bloating      RLQ abdominal pain      (gastritis; internal hemorrhoids )    Surgeons: Harjit Vazquez MD Anesthesiologist: Dianna Leonard CRNA    Anesthesia Type: MAC ASA Status: 2          Anesthesia Type: MAC    Vitals Value Taken Time   /57 10/03/22 1327   Temp  10/03/22 1327   Pulse 68 10/03/22 1327   Resp 16 10/03/22 1327   SpO2 100 10/03/22 1327       [address] [place] [address] AN Post Evaluation:   Patient Evaluated in PACU  Patient Participation: complete - patient participated  Level of Consciousness: sleepy but conscious  Pain Score: 0  Pain Management: adequate  Airway Patency:patent  Dental exam unchanged from preop  Yes    Cardiovascular Status: stable  Respiratory Status: acceptable and nasal cannula  Postoperative Hydration stable      Hallie Ramirez CRNA  10/3/2022 1:27 PM

## 2022-10-25 ENCOUNTER — TELEPHONE (OUTPATIENT)
Facility: CLINIC | Age: 42
End: 2022-10-25

## 2022-10-25 NOTE — TELEPHONE ENCOUNTER
----- Message from Dory Cheung MD sent at 10/23/2022  3:57 PM CDT -----  Please recall for colonoscopy examination in 5 years

## 2022-10-25 NOTE — TELEPHONE ENCOUNTER
Health maintenance updated. Last colonoscopy done 10/3/22. 5 year recall placed into [facility], next due on 10/3/27 per Dr. Mayda Flores.

## 2023-06-15 ENCOUNTER — NURSE ONLY (OUTPATIENT)
Dept: ALLERGY | Facility: CLINIC | Age: 43
End: 2023-06-15

## 2023-06-15 ENCOUNTER — LAB ENCOUNTER (OUTPATIENT)
Dept: LAB | Age: 43
End: 2023-06-15
Attending: ALLERGY & IMMUNOLOGY
Payer: COMMERCIAL

## 2023-06-15 ENCOUNTER — OFFICE VISIT (OUTPATIENT)
Dept: ALLERGY | Facility: CLINIC | Age: 43
End: 2023-06-15
Payer: COMMERCIAL

## 2023-06-15 VITALS
OXYGEN SATURATION: 96 % | DIASTOLIC BLOOD PRESSURE: 86 MMHG | RESPIRATION RATE: 18 BRPM | SYSTOLIC BLOOD PRESSURE: 132 MMHG | HEART RATE: 65 BPM

## 2023-06-15 DIAGNOSIS — L50.9 URTICARIA: ICD-10-CM

## 2023-06-15 DIAGNOSIS — J30.89 SEASONAL AND PERENNIAL ALLERGIC RHINOCONJUNCTIVITIS: ICD-10-CM

## 2023-06-15 DIAGNOSIS — J30.2 SEASONAL AND PERENNIAL ALLERGIC RHINOCONJUNCTIVITIS: ICD-10-CM

## 2023-06-15 DIAGNOSIS — Z23 FLU VACCINE NEED: ICD-10-CM

## 2023-06-15 DIAGNOSIS — H10.10 SEASONAL AND PERENNIAL ALLERGIC RHINOCONJUNCTIVITIS: ICD-10-CM

## 2023-06-15 DIAGNOSIS — Z91.018 FOOD ALLERGY: Primary | ICD-10-CM

## 2023-06-15 DIAGNOSIS — Z91.018 FOOD ALLERGY: ICD-10-CM

## 2023-06-15 PROCEDURE — 36415 COLL VENOUS BLD VENIPUNCTURE: CPT

## 2023-06-15 PROCEDURE — 99204 OFFICE O/P NEW MOD 45 MIN: CPT | Performed by: ALLERGY & IMMUNOLOGY

## 2023-06-15 PROCEDURE — 86364 TISS TRNSGLTMNASE EA IG CLAS: CPT

## 2023-06-15 PROCEDURE — 3079F DIAST BP 80-89 MM HG: CPT | Performed by: ALLERGY & IMMUNOLOGY

## 2023-06-15 PROCEDURE — 82784 ASSAY IGA/IGD/IGG/IGM EACH: CPT

## 2023-06-15 PROCEDURE — 95004 PERQ TESTS W/ALRGNC XTRCS: CPT | Performed by: ALLERGY & IMMUNOLOGY

## 2023-06-15 PROCEDURE — 3075F SYST BP GE 130 - 139MM HG: CPT | Performed by: ALLERGY & IMMUNOLOGY

## 2023-06-15 NOTE — PATIENT INSTRUCTIONS
#1 Food allergies  See above clinical history  Patient reports acute itching on her face and abdomen with intermittent hives on her face within minutes of eating select foods including pizza, baked cheeses/melted cheeses peanut butter almond butter sunflower seed oil  No history of systemic reactions. No ED visits. No treatment required. Resolves with time. Differential includes IgE mediated food allergy versus potential food intolerance/high histamine food  See above skin testing to screen for an IgE mediated food allergy  Recommend to avoid any foods that are positive on skin testing  If skin testing is negative and foods continue to cause symptoms may potentially be a food intolerance from high histamine foods. Continue to avoid those foods that cause unwanted symptoms. Reviewed most food allergies are from the protein present foods rather than the fat lipid or oil  May continue with food diary and attempt to isolate foods that cause unwanted symptoms  Handouts on high histamine foods provided and reviewed    #2 allergic rhinitis  Defers testing at this time. Reviewed prior testing from 2012  Continue with Zyrtec 10 mg once a day  May add Flonase or Nasacort 2 sprays per nostril once a day if having prominent nasal congestion or postnasal drip  Reviewed avoidance measures and potential treatment option immunotherapy      #3 COVID vaccines recommended. #4 flu vaccine recommended in the fall    #5 urticaria  Handouts provided and reviewed  Trial of Zyrtec 10 mg once a day up to 4 times per day if needed  See above testing to foods and screen for triggers.   Advised be watchful for potential rosacea if having facial itching and redness reviewed hot foods spicy foods, alcohol and sunlight

## 2023-06-16 LAB — IGA SERPL-MCNC: 208 MG/DL (ref 70–312)

## 2023-06-19 ENCOUNTER — TELEPHONE (OUTPATIENT)
Dept: ALLERGY | Facility: CLINIC | Age: 43
End: 2023-06-19

## 2023-06-19 LAB — TTG IGA SER-ACNC: 0.7 U/ML (ref ?–7)

## 2023-06-19 NOTE — TELEPHONE ENCOUNTER
----- Message from Marleny Bartlett MD sent at 6/19/2023  3:18 PM CDT -----   Please contact patient with normal tissue transglutaminase.   No signs of celiac disease at this time

## 2023-06-19 NOTE — TELEPHONE ENCOUNTER
----- Message from Kathey Hatchet, MD sent at 6/17/2023  7:34 AM CDT -----  Total IgA normal.  Celiac panel still pending

## 2023-11-07 ENCOUNTER — ORDER TRANSCRIPTION (OUTPATIENT)
Dept: ADMINISTRATIVE | Facility: HOSPITAL | Age: 43
End: 2023-11-07

## 2023-11-07 DIAGNOSIS — Z13.9 ENCOUNTER FOR SCREENING: Primary | ICD-10-CM

## 2023-11-29 ENCOUNTER — HOSPITAL ENCOUNTER (OUTPATIENT)
Dept: CT IMAGING | Facility: HOSPITAL | Age: 43
Discharge: HOME | End: 2023-11-29
Attending: FAMILY MEDICINE

## 2023-11-29 VITALS — SYSTOLIC BLOOD PRESSURE: 120 MMHG | DIASTOLIC BLOOD PRESSURE: 74 MMHG

## 2023-11-29 DIAGNOSIS — Z13.9 ENCOUNTER FOR SCREENING: ICD-10-CM

## 2023-11-30 NOTE — PROGRESS NOTES
Date of Service 11/29/2023    Jennifer Guo  Date of Birth 11/25/1980    Patient Age: 37year old    PCP: DO Verena [address]  [place] [address]    Heart Scan Consult  Preliminary Heart Scan Score: 0    Previous Screening  Heart Scan Completed Previously: No        Peripheral Vascular Scan Completed Previously: No          Risk Factors  Personal Risk Factors  Alterable Risk Factors: Abnormal Cholesterol      Body Mass Index  There is no height or weight on file to calculate BMI. Blood Pressure     /74 (BP Location: Right arm)     (Normal =< 120/80,  Elevated = 120-129/ >80,  High Stage1 130-139/80-89 , Stage2 >140/>90)    Lipid Profile  1/29/2023  Total Cholesterol - 164  HDL - 30  LDL - 109  Triglycerides -  135    Cholesterol Goals  Value   Total  =< 200   HDL  = > 45 Men = > 55 Women   LDL   =< 100   Triglycerides  =< 150       Glucose and Hemoglobin A1C  Lab Results   Component Value Date    GLU 84 01/29/2023    A1C 5.2 01/29/2023     (Normal Fasting Glucose < 100mg/dl )    Nurse Review  Risk factor information and results reviewed with Nurse: Yes    Recommended Follow Up:  Consult your physician regarding[de-identified] Final Heart Scan Report; Discuss potential for Incidental Finding      Recommendations for Change:  Nutrition Changes: Low Saturated Fat;Low Fat Dairy; Low Salt Eating; Increase Fiber    Cholesterol Modification (goal of therapy depends upon your risk): Increase HDL (Healthy/Good) Normal >45 Men >55 Women;Decrease LDL (Lousy/Bad) Ideal <100    Exercise:  (Reports exercising daily via strength training and walking incline)              Stress Management: Adopt Stress Management Techniques    Repeat Heart Scan: 5 years if Calcium Score is 0. 0; Discuss with your Physician              [facility] Recommended Resources:  Recommended Resources: Upcoming Classes, Medical Services and Health Library [email or web address]     Other Resources[de-identified] Handout given - Controlling Risk Factors and Cholesterol      Daphnie Vick, ARNOLDO        Please Contact the Nurse Heart Line with any Questions or Concerns [phone].

## 2024-01-15 ENCOUNTER — OFFICE VISIT (OUTPATIENT)
Dept: OBGYN CLINIC | Facility: CLINIC | Age: 44
End: 2024-01-15

## 2024-01-15 VITALS
HEART RATE: 60 BPM | WEIGHT: 126 LBS | DIASTOLIC BLOOD PRESSURE: 104 MMHG | SYSTOLIC BLOOD PRESSURE: 170 MMHG | BODY MASS INDEX: 24.74 KG/M2 | HEIGHT: 60 IN

## 2024-01-15 DIAGNOSIS — Z12.31 SCREENING MAMMOGRAM FOR BREAST CANCER: ICD-10-CM

## 2024-01-15 DIAGNOSIS — N32.81 OAB (OVERACTIVE BLADDER): ICD-10-CM

## 2024-01-15 DIAGNOSIS — R39.9 UTI SYMPTOMS: ICD-10-CM

## 2024-01-15 DIAGNOSIS — Z01.419 WELL WOMAN EXAM: Primary | ICD-10-CM

## 2024-01-15 DIAGNOSIS — N89.8 VAGINAL DISCHARGE: ICD-10-CM

## 2024-01-15 PROBLEM — B37.31 YEAST VAGINITIS: Status: RESOLVED | Noted: 2020-02-03 | Resolved: 2024-01-15

## 2024-01-15 LAB
APPEARANCE: CLEAR
BILIRUBIN: NEGATIVE
GLUCOSE (URINE DIPSTICK): NEGATIVE MG/DL
KETONES (URINE DIPSTICK): NEGATIVE MG/DL
LEUKOCYTES: NEGATIVE
MULTISTIX LOT#: ABNORMAL NUMERIC
NITRITE, URINE: NEGATIVE
OCCULT BLOOD: NEGATIVE
PH, URINE: 5 (ref 4.5–8)
PROTEIN (URINE DIPSTICK): NEGATIVE MG/DL
SPECIFIC GRAVITY: 1 (ref 1–1.03)
UROBILINOGEN,SEMI-QN: 0.2 MG/DL (ref 0–1.9)

## 2024-01-15 PROCEDURE — 81514 NFCT DS BV&VAGINITIS DNA ALG: CPT | Performed by: OBSTETRICS & GYNECOLOGY

## 2024-01-15 NOTE — PROGRESS NOTES
HPI:    Patient ID: Darshana Rodriges is a 43 year old year old female.    HPI  Well woman visit  Also brings problem of urinary frequency.  History of overactive bladder.  History of SEA in 2016.  Has on and off discharge but nothing heavy.  Denies itching or burning.  Denies hematuria.  Nocturia has resolved.  Urinary frequency is every 1/2 hour or more.  Advised to see urology for evaluation.  Is overdue for mammogram.  Review of Systems   Constitutional: Negative.    Cardiovascular: Negative.    Gastrointestinal: Negative.    Genitourinary: Negative.    Skin: Negative.    Neurological: Negative.    Psychiatric/Behavioral: Negative.     All other systems reviewed and are negative.       Current Outpatient Medications   Medication Sig Dispense Refill    fluconazole (DIFLUCAN) 150 MG Oral Tab Take one tablet by mouth and another one 3 days later. (Patient not taking: Reported on 4/7/2022) 2 tablet 2    Promethazine-Codeine 6.25-10 MG/5ML Oral Solution Take 5 mL by mouth nightly as needed. (Patient not taking: No sig reported) 100 mL 0    Multiple Vitamins-Minerals (MULTI-VITAMIN/MINERALS) Oral Tab Take 1 tablet by mouth daily. Taking 3-4 times per week         Physical Exam     Vitals: LMP 06/29/2016   Neck: Supple and without masses.  No cervical adenopathy.    Breasts: Symmetric.  Skin without lesions.  No masses.  Areolae are normal.  Nipples without discharge.  No axillary or supra cervical adenopathy    Abdominal: Soft. Normal appearance and bowel sounds are normal. She exhibits no mass. There is no hepatosplenomegaly. There is no tenderness. There is no rebound and no CVA tenderness. No hernia. Hernia negative in the ventral area,  negative in the right inguinal area and negative in the left inguinal area.     Genitourinary:   Pelvic exam was performed with patient supine and chaperone present.  External genitalia- normal.  Bartholin's and [place]'s glands normal.  Urethral meatus- without lesions, mass, or  discharge.  Urethra- normal without lesion, cyst, mass, or tenderness.  Vulva- normal.  Labia majora and minora without lesions.   Vagina- normal, no lesions; gray discharge.  Moist and well supported.  Bladder-  nontender.  No masses.  Normal support.  No evidence of cystocele,  abnormal bladder neck mobility or evident urinary incontinence.  Cervix and uterus absent  Adnexa-  Nontender, no masses.   Perineum- normal without lesions  Anus-  Normal appearing without lesions.     ASSESSMENT/PLAN:      ICD-10-CM    1. Well woman exam  Z01.419       2. Screening mammogram for breast cancer  Z12.31       3. OAB (overactive bladder)  N32.81       4. UTI symptoms  R39.9       Normal exam.  Pap/HPV co-test every 3 years when previous normal/-HPV.  Monthly self breast exam.  Annual screening mammograms.  Contraception discussed as needed.  Screening colonoscopy at age 50, earlier if indicated.  Recommend regular exercise and quality diet.  Return to clinic in one year or as needed.    Vaginal discharge-suspect BV.  Await culture  Urinary frequency-overactive bladder.  Elevated blood pressure without diagnosis of hypertension.  No CNS or cardiovascular symptoms.  Encouraged to check blood pressure at home and follow-up with her primary care physician  Has history of whitecoat hypertension.    Outpatient Encounter Medications as of 1/15/2024   Medication Sig Dispense Refill    fluconazole (DIFLUCAN) 150 MG Oral Tab Take one tablet by mouth and another one 3 days later. (Patient not taking: Reported on 4/7/2022) 2 tablet 2    Promethazine-Codeine 6.25-10 MG/5ML Oral Solution Take 5 mL by mouth nightly as needed. (Patient not taking: No sig reported) 100 mL 0    Multiple Vitamins-Minerals (MULTI-VITAMIN/MINERALS) Oral Tab Take 1 tablet by mouth daily. Taking 3-4 times per week       No facility-administered encounter medications on file as of 1/15/2024.

## 2024-01-16 LAB
BV BACTERIA DNA VAG QL NAA+PROBE: NEGATIVE
C GLABRATA DNA VAG QL NAA+PROBE: NEGATIVE
C KRUSEI DNA VAG QL NAA+PROBE: NEGATIVE
CANDIDA DNA VAG QL NAA+PROBE: NEGATIVE
T VAGINALIS DNA VAG QL NAA+PROBE: NEGATIVE

## 2024-03-30 ENCOUNTER — APPOINTMENT (OUTPATIENT)
Dept: CT IMAGING | Facility: HOSPITAL | Age: 44
End: 2024-03-30
Attending: EMERGENCY MEDICINE
Payer: COMMERCIAL

## 2024-03-30 ENCOUNTER — HOSPITAL ENCOUNTER (EMERGENCY)
Facility: HOSPITAL | Age: 44
Discharge: HOME | End: 2024-03-30
Attending: EMERGENCY MEDICINE
Payer: COMMERCIAL

## 2024-03-30 VITALS
OXYGEN SATURATION: 95 % | SYSTOLIC BLOOD PRESSURE: 129 MMHG | WEIGHT: 130 LBS | DIASTOLIC BLOOD PRESSURE: 85 MMHG | RESPIRATION RATE: 14 BRPM | HEIGHT: 61 IN | TEMPERATURE: 98 F | BODY MASS INDEX: 24.55 KG/M2 | HEART RATE: 64 BPM

## 2024-03-30 DIAGNOSIS — N13.2 URETERAL STONE WITH HYDRONEPHROSIS: Primary | ICD-10-CM

## 2024-03-30 LAB
ANION GAP SERPL CALC-SCNC: 8 MMOL/L (ref 0–18)
B-HCG UR QL: NEGATIVE
BASOPHILS # BLD AUTO: 0.01 X10(3) UL (ref 0–0.2)
BASOPHILS NFR BLD AUTO: 0.1 %
BILIRUB UR QL: NEGATIVE
BUN BLD-MCNC: 13 MG/DL (ref 9–23)
BUN/CREAT SERPL: 13.7 (ref 10–20)
CALCIUM BLD-MCNC: 9.5 MG/DL (ref 8.7–10.4)
CHLORIDE SERPL-SCNC: 104 MMOL/L (ref 98–112)
CLARITY UR: CLEAR
CO2 SERPL-SCNC: 24 MMOL/L (ref 21–32)
CREAT BLD-MCNC: 0.95 MG/DL
DEPRECATED RDW RBC AUTO: 37.7 FL (ref 35.1–46.3)
EGFRCR SERPLBLD CKD-EPI 2021: 76 ML/MIN/1.73M2 (ref 60–?)
EOSINOPHIL # BLD AUTO: 0.04 X10(3) UL (ref 0–0.7)
EOSINOPHIL NFR BLD AUTO: 0.4 %
ERYTHROCYTE [DISTWIDTH] IN BLOOD BY AUTOMATED COUNT: 12.1 % (ref 11–15)
GLUCOSE BLD-MCNC: 101 MG/DL (ref 70–99)
GLUCOSE UR-MCNC: NORMAL MG/DL
HCT VFR BLD AUTO: 41 %
HGB BLD-MCNC: 15 G/DL
IMM GRANULOCYTES # BLD AUTO: 0.02 X10(3) UL (ref 0–1)
IMM GRANULOCYTES NFR BLD: 0.2 %
KETONES UR-MCNC: NEGATIVE MG/DL
LEUKOCYTE ESTERASE UR QL STRIP.AUTO: 75
LYMPHOCYTES # BLD AUTO: 1.62 X10(3) UL (ref 1–4)
LYMPHOCYTES NFR BLD AUTO: 15.2 %
MCH RBC QN AUTO: 31.1 PG (ref 26–34)
MCHC RBC AUTO-ENTMCNC: 36.6 G/DL (ref 31–37)
MCV RBC AUTO: 84.9 FL
MONOCYTES # BLD AUTO: 1.03 X10(3) UL (ref 0.1–1)
MONOCYTES NFR BLD AUTO: 9.7 %
NEUTROPHILS # BLD AUTO: 7.94 X10 (3) UL (ref 1.5–7.7)
NEUTROPHILS # BLD AUTO: 7.94 X10(3) UL (ref 1.5–7.7)
NEUTROPHILS NFR BLD AUTO: 74.4 %
NITRITE UR QL STRIP.AUTO: NEGATIVE
OSMOLALITY SERPL CALC.SUM OF ELEC: 282 MOSM/KG (ref 275–295)
PH UR: 6.5 [PH] (ref 5–8)
PLATELET # BLD AUTO: 194 10(3)UL (ref 150–450)
POTASSIUM SERPL-SCNC: 4.2 MMOL/L (ref 3.5–5.1)
RBC # BLD AUTO: 4.83 X10(6)UL
RBC #/AREA URNS AUTO: >10 /HPF
SODIUM SERPL-SCNC: 136 MMOL/L (ref 136–145)
SP GR UR STRIP: 1.02 (ref 1–1.03)
UROBILINOGEN UR STRIP-ACNC: NORMAL
WBC # BLD AUTO: 10.7 X10(3) UL (ref 4–11)

## 2024-03-30 PROCEDURE — 80048 BASIC METABOLIC PNL TOTAL CA: CPT | Performed by: EMERGENCY MEDICINE

## 2024-03-30 PROCEDURE — 99284 EMERGENCY DEPT VISIT MOD MDM: CPT

## 2024-03-30 PROCEDURE — 85025 COMPLETE CBC W/AUTO DIFF WBC: CPT | Performed by: EMERGENCY MEDICINE

## 2024-03-30 PROCEDURE — 81025 URINE PREGNANCY TEST: CPT

## 2024-03-30 PROCEDURE — 87086 URINE CULTURE/COLONY COUNT: CPT | Performed by: EMERGENCY MEDICINE

## 2024-03-30 PROCEDURE — 99285 EMERGENCY DEPT VISIT HI MDM: CPT

## 2024-03-30 PROCEDURE — 96374 THER/PROPH/DIAG INJ IV PUSH: CPT

## 2024-03-30 PROCEDURE — 74176 CT ABD & PELVIS W/O CONTRAST: CPT | Performed by: EMERGENCY MEDICINE

## 2024-03-30 PROCEDURE — 81001 URINALYSIS AUTO W/SCOPE: CPT | Performed by: EMERGENCY MEDICINE

## 2024-03-30 RX ORDER — TAMSULOSIN HYDROCHLORIDE 0.4 MG/1
0.4 CAPSULE ORAL DAILY
Qty: 14 CAPSULE | Refills: 0 | Status: SHIPPED | OUTPATIENT
Start: 2024-03-30 | End: 2024-04-13

## 2024-03-30 RX ORDER — ONDANSETRON 4 MG/1
4 TABLET, ORALLY DISINTEGRATING ORAL EVERY 4 HOURS PRN
Qty: 10 TABLET | Refills: 0 | Status: SHIPPED | OUTPATIENT
Start: 2024-03-30 | End: 2024-04-06

## 2024-03-30 RX ORDER — KETOROLAC TROMETHAMINE 15 MG/ML
15 INJECTION, SOLUTION INTRAMUSCULAR; INTRAVENOUS ONCE
Status: COMPLETED | OUTPATIENT
Start: 2024-03-30 | End: 2024-03-30

## 2024-03-30 RX ORDER — KETOROLAC TROMETHAMINE 10 MG/1
10 TABLET, FILM COATED ORAL EVERY 6 HOURS PRN
Qty: 30 TABLET | Refills: 0 | Status: SHIPPED | OUTPATIENT
Start: 2024-03-30 | End: 2024-04-06

## 2024-03-30 NOTE — ED INITIAL ASSESSMENT (HPI)
Pt presents to ED with right sided flank pain since 0330 this morning. Pt denies dysuria or urinary frequency. Pt came from IC, diagnosed with UTI but sent here to r/o kidney stone

## 2024-03-30 NOTE — DISCHARGE INSTRUCTIONS
You should return to the emergency department if you develop severe nausea and vomiting and are unable to keep liquids down, you developed severe back/flank or stomach pain, or if your symptoms are not clearly improving at home.

## 2024-03-30 NOTE — ED PROVIDER NOTES
Patient Seen in: [facility] Emergency Department    History     Chief Complaint   Patient presents with    Abdomen/Flank Pain     Stated Complaint: Abd pain, UTI, blood in urine - came from Urgent    43-year-old female with history of hypertension, hysterectomy, presents for evaluation of abdominal pain.  Pain began in the middle of the night.  It is located at the right low back radiating to the right flank and right lower quadrant.  No fevers.  She did have an episode of vomiting.  She has urinary frequency.  She came from immediate care and was told she has some hematuria.  She does not have known history of kidney stones.    Past Medical History:   Diagnosis Date    Abnormal uterine bleeding (AUB) 05/20/2016    Anemia     Constipation     Dysmenorrhea 05/20/2016    Fatty liver     Fibroids     High blood pressure     Intramural leiomyoma of uterus 05/20/2016    Menorrhagia with regular cycle 07/12/2016    Metrorrhagia 06/04/2015    Multiple allergies     Yeast vaginitis        Past Surgical History:   Procedure Laterality Date    COLONOSCOPY N/A 10/3/2022    Procedure: COLONOSCOPY;  Surgeon: Hilario Smith MD;  Location: [facility] ENDOSCOPY    HYSTERECTOMY      PRIOR MYOMECTOMY      3 prev surgeries    REMOVAL OF OVARIAN CYST(S)  2010    TOTAL ABDOM HYSTERECTOMY      TOTAL ABDOMINAL HYSTERECTOMY  7/13/16            Family History   Problem Relation Age of Onset    Diabetes Other     Hypertension Other     Lipids Other         hyperlipidemia    Other (Other) Other         fatty liver       Social History     Socioeconomic History    Marital status:    Tobacco Use    Smoking status: Former    Smokeless tobacco: Never   Vaping Use    Vaping Use: Never used   Substance and Sexual Activity    Alcohol use: No     Alcohol/week: 0.0 standard drinks of alcohol    Drug use: No    Sexual activity: Yes     Partners: Male   Other Topics Concern    Caffeine Concern Yes     Comment: soda       Review of  Systems    Positive for stated complaint: Abd pain, UTI, blood in urine - came from Urgent  Other systems are as noted in HPI.  Constitutional and vital signs reviewed.      All other systems reviewed and negative except as noted above.    PSFH elements reviewed from today and agreed except as otherwise stated in HPI.    Physical Exam     ED Triage Vitals [03/30/24 0946]   BP (!) 160/93   Pulse 66   Resp 14   Temp 97.8 °F (36.6 °C)   Temp src    SpO2 98 %   O2 Device None (Room air)       Current:/85   Pulse 64   Temp 97.8 °F (36.6 °C)   Resp 14   Ht 154.9 cm (5' 1\")   Wt 59 kg   LMP 06/29/2016   SpO2 95%   BMI 24.56 kg/m²           General Appearance: appears uncomfortable, in pain,  Eyes: pupils equal and round no pallor or injection  ENT, Mouth: mucous membranes moist  Respiratory: there are no retractions, lungs are clear to auscultation  Cardiovascular: regular rate and rhythm    Gastrointestinal:  abdomen is soft and non tender, no masses, bowel sounds normal, no flank tenderness  Neurological: II-XII grossly intact  no focal deficits  Skin: warm and dry, no rashes.  Musculoskeletal: neck is supple non tender        Extremities are symmetrical, full range of motion  Psychiatric: patient is pleasant, there is no agitation    DIFFERENTIAL DIAGNOSIS: After history and physical exam differential diagnosis was considered for  renal colic with probably urolithiasis vs. pyelo vs. enteritis        ED Course     Labs Reviewed   BASIC METABOLIC PANEL (8) - Abnormal; Notable for the following components:       Result Value    Glucose 101 (*)     All other components within normal limits   URINALYSIS WITH CULTURE REFLEX - Abnormal; Notable for the following components:    Blood Urine 3+ (*)     Protein Urine Trace (*)     Leukocyte Esterase Urine 75 (*)     RBC Urine >10 (*)     Squamous Epi. Cells Few (*)     All other components within normal limits   CBC W/ DIFFERENTIAL - Abnormal; Notable for the  following components:    Neutrophil Absolute Prelim 7.94 (*)     Neutrophil Absolute 7.94 (*)     Monocyte Absolute 1.03 (*)     All other components within normal limits   POCT PREGNANCY URINE - Normal   CBC WITH DIFFERENTIAL WITH PLATELET    Narrative:     The following orders were created for panel order CBC With Differential With Platelet.  Procedure                               Abnormality         Status                     ---------                               -----------         ------                     CBC W/ DIFFERENTIAL[[id number]]          Abnormal            Final result                 Please view results for these tests on the individual orders.   URINE CULTURE, ROUTINE     CT ABDOMEN+PELVIS KIDNEYSTONE 2D RNDR(NO IV,NO ORAL)(CPT=74176)    Result Date: 3/30/2024  CONCLUSION:  1.  Mild right hydroureteronephrosis related to an obstructing 3 x 6 mm stone in the distal right ureter abutting the UVJ.  Additional nonobstructing 5 mm stone within the right kidney.  Prominent fat stranding around the right kidney and right ureter raising the possibility of superimposed upper urinary tract infection.  No additional calculi in the kidneys, ureters, or bladder. 2.  Post hysterectomy and oophorectomy.   Dictated by (CST): Ezra Peters MD on 3/30/2024 at 10:40 AM     Finalized by (CST): Ezra Peters MD on 3/30/2024 at 10:49 AM           [facility]           Medical Decision Making     Per my independent interpretation of CT abdomen pelvis there is concern for right obstructing ureteral stone, reviewed other radiology findings as below and agree.  Labs unremarkable and urinalysis negative for UTI.  She feels much better after Toradol and pain is resolved.  Will be discharged with expectant management on short course of Flomax and Toradol as well as Zofran as needed and follow-up with urology.    Disposition and Plan     Clinical Impression:  1. Ureteral stone with hydronephrosis         Disposition:  Discharge    Follow-up:  Ruben David MD  [facility] [address]  [facility] [address]  [phone]    Follow up        Medications Prescribed:  Current Discharge Medication List        START taking these medications    Details   Ketorolac Tromethamine 10 MG Oral Tab Take 1 tablet (10 mg total) by mouth every 6 (six) hours as needed for Pain.  Qty: 30 tablet, Refills: 0      tamsulosin (FLOMAX) 0.4 MG Oral Cap Take 1 capsule (0.4 mg total) by mouth daily for 14 days.  Qty: 14 capsule, Refills: 0      ondansetron 4 MG Oral Tablet Dispersible Take 1 tablet (4 mg total) by mouth every 4 (four) hours as needed for Nausea.  Qty: 10 tablet, Refills: 0

## 2024-09-05 PROBLEM — R03.0 ELEVATED BP WITHOUT DIAGNOSIS OF HYPERTENSION: Status: RESOLVED | Noted: 2020-02-03 | Resolved: 2024-09-05

## 2024-09-05 PROBLEM — F90.0 ATTENTION DEFICIT HYPERACTIVITY DISORDER (ADHD), PREDOMINANTLY INATTENTIVE TYPE: Status: ACTIVE | Noted: 2024-09-05

## 2024-09-05 PROBLEM — Z12.31 SCREENING MAMMOGRAM FOR BREAST CANCER: Status: RESOLVED | Noted: 2024-01-15 | Resolved: 2024-09-05

## 2024-09-05 PROBLEM — Z12.31 SCREENING MAMMOGRAM, ENCOUNTER FOR: Status: RESOLVED | Noted: 2018-08-18 | Resolved: 2024-09-05

## 2024-09-05 PROBLEM — N89.8 VAGINAL DISCHARGE: Status: RESOLVED | Noted: 2024-01-15 | Resolved: 2024-09-05

## 2024-09-05 PROBLEM — Z01.419 WELL WOMAN EXAM: Status: RESOLVED | Noted: 2024-01-15 | Resolved: 2024-09-05

## 2024-09-05 PROBLEM — R39.9 UTI SYMPTOMS: Status: RESOLVED | Noted: 2022-03-17 | Resolved: 2024-09-05

## 2024-09-10 ENCOUNTER — OFFICE VISIT (OUTPATIENT)
Dept: SURGERY | Facility: CLINIC | Age: 44
End: 2024-09-10

## 2024-09-10 VITALS
WEIGHT: 130 LBS | BODY MASS INDEX: 25.52 KG/M2 | DIASTOLIC BLOOD PRESSURE: 86 MMHG | HEIGHT: 60 IN | SYSTOLIC BLOOD PRESSURE: 142 MMHG

## 2024-09-10 DIAGNOSIS — R35.0 URINARY FREQUENCY: Primary | ICD-10-CM

## 2024-09-10 LAB
BILIRUB UR QL: NEGATIVE
CLARITY UR: CLEAR
COLOR UR: COLORLESS
GLUCOSE UR-MCNC: NORMAL MG/DL
HGB UR QL STRIP.AUTO: NEGATIVE
KETONES UR-MCNC: NEGATIVE MG/DL
LEUKOCYTE ESTERASE UR QL STRIP.AUTO: NEGATIVE
NITRITE UR QL STRIP.AUTO: NEGATIVE
PH UR: 6 [PH] (ref 5–8)
PROT UR-MCNC: NEGATIVE MG/DL
SP GR UR STRIP: 1.01 (ref 1–1.03)
UROBILINOGEN UR STRIP-ACNC: NORMAL

## 2024-09-10 PROCEDURE — 3079F DIAST BP 80-89 MM HG: CPT | Performed by: UROLOGY

## 2024-09-10 PROCEDURE — 99244 OFF/OP CNSLTJ NEW/EST MOD 40: CPT | Performed by: UROLOGY

## 2024-09-10 PROCEDURE — 3008F BODY MASS INDEX DOCD: CPT | Performed by: UROLOGY

## 2024-09-10 PROCEDURE — 3077F SYST BP >= 140 MM HG: CPT | Performed by: UROLOGY

## 2024-09-10 RX ORDER — SOLIFENACIN SUCCINATE 10 MG/1
10 TABLET, FILM COATED ORAL DAILY
Qty: 90 TABLET | Refills: 3 | Status: SHIPPED | OUTPATIENT
Start: 2024-09-10

## 2024-09-10 NOTE — PROGRESS NOTES
SUBJECTIVE:  Darshana Rodriges is a 43 year old female who presents for a consultation at the request of, and a copy of this note will be sent to, Leslie Kumar, for evaluation of  urinary frquency. She states that the problem is chronic history of frequency and urgency but the patient has had for at least 2 to 3 years.  She saw our physician assistant in 2022.  She has symptoms consistent with overactive bladder.  Has rare stress urinary incontinence.  She had a simple hysterectomy in  for heavy menstrual bleeding and fibroids.  Symptoms include other complaints. She denies any other complaints.  She is  0 para 0  Allergies:   Allergies   Allergen Reactions    Plasma, Human HIVES    Dust OTHER (SEE COMMENTS)     Sinus congestion ,headache       History:  Past Medical History:    Abnormal uterine bleeding (AUB)    Anemia    Constipation    Dysmenorrhea    Fatty liver    Fibroids    High blood pressure    Intramural leiomyoma of uterus    Menorrhagia with regular cycle    Metrorrhagia    Multiple allergies    Yeast vaginitis      Past Surgical History:   Procedure Laterality Date    Colonoscopy N/A 10/3/2022    Procedure: COLONOSCOPY;  Surgeon: Hilario Smith MD;  Location[facility] ENDOSCOPY    Hysterectomy      Prior myomectomy      3 prev surgeries    Removal of ovarian cyst(s)      Total abdom hysterectomy      Total abdominal hysterectomy  16      Family History   Problem Relation Age of Onset    Diabetes Other     Hypertension Other     Lipids Other         hyperlipidemia    Other (Other) Other         fatty liver      Social History:   Social History     Socioeconomic History    Marital status:    Tobacco Use    Smoking status: Former    Smokeless tobacco: Never   Vaping Use    Vaping status: Never Used   Substance and Sexual Activity    Alcohol use: No     Alcohol/week: 0.0 standard drinks of alcohol    Drug use: No    Sexual activity: Yes     Partners: Male   Other  Topics Concern    Caffeine Concern Yes     Comment: soda     Social Determinants of Health     Physical Activity: Medium Risk (1/23/2023)    Received from [facility]    Physical Activity     How often do you engage in moderate physical activity for 30 minutes or more?: 1 to 3 days a week     How often do you engage in vigorous physical activity for 20 minutes or more?: 1 to 3 days a week     How many hours per day do you spend sitting?: 4 to 6 hours    Received from [facility]    Stress            REVIEW OF SYSTEMS:  RESPIRATORY:  Negative for chest tightness, wheezing, cough, shortness of breath,  sputum production,chest pain or pleurisy.  CARDIOVASCULAR:  Negative for pain or chest discomfort, dizziness or fainting, palpitations, leg swelling, nocturia, or claudication.  GASTROINTESTINAL:  Negative for nausea, vomiting, diarrhea, constipation, heartburn or indigestion, abdominal pains, bloody or tarry stools.  GENERAL: Denies:  weight gain, weight loss, fever, night sweats, bone pain, malaise, and fatigue. Positive for:  none.  All other review of systems reviewed and otherwise negative    OBJECTIVE:  /86 (BP Location: Left arm, Patient Position: Sitting, Cuff Size: adult)   Ht 5' (1.524 m)   Wt 130 lb (59 kg)   LMP 06/29/2016   BMI 25.39 kg/m²   She appears well, in no apparent distress.  Alert and oriented times three, pleasant and cooperative.  CARDIOVASCULAR:normal apical impulse  RESPIRATORY: no chest wall deformities or tenderness  ABDOMEN: abdomen is soft without significant tenderness, masses, organomegaly or guarding  GENITOURINARY:      Urethra: Normal      Vagina: Normal      Introitus: Normal      Anus: Normal      Inguinal nodes: Normal  Skin exam grossly normal  Intact neurologically grossly    ASSESSMENT/PLAN  Encounter Diagnosis   Name Primary?    Urinary frequency Yes   Reviewed findings at length with patient.  Discussed options for management.   Recommended empiric trial of Solifenacin 10 mg daily.  Side effects reviewed.  Follow-up on urinalysis with microscopy from today.    Orders Placed This Encounter   Procedures    Urinalysis, Routine       Meds This Visit:  Requested Prescriptions     Signed Prescriptions Disp Refills    Solifenacin Succinate (VESICARE) 10 MG Oral Tab 90 tablet 3     Sig: Take 1 tablet (10 mg total) by mouth daily.       Imaging & Referrals:  None

## 2024-09-11 ENCOUNTER — PATIENT MESSAGE (OUTPATIENT)
Dept: SURGERY | Facility: CLINIC | Age: 44
End: 2024-09-11

## 2024-09-18 NOTE — TELEPHONE ENCOUNTER
From: Darshana Rodriges  To: Juan Ramon Maradiaga  Sent: 9/11/2024 4:30 PM CDT  Subject: Urine test     Hello Doctor,     After viewing my urine test results, I would like to know what does colorless mean? It shows normal it’s supposed to be yellow.     Thanks,    Darshana

## 2024-11-07 NOTE — TELEPHONE ENCOUNTER
LMTCB.
Pt calling stating that one of the medications JDO prescribed is making her drowsy throughout the day. Pt unsure of which specific medication is making her drowsy. Please advise.
Pt will be seen in office on 02/12
no

## 2025-01-02 ENCOUNTER — OFFICE VISIT (OUTPATIENT)
Dept: OBGYN CLINIC | Facility: CLINIC | Age: 45
End: 2025-01-02

## 2025-01-02 ENCOUNTER — TELEPHONE (OUTPATIENT)
Dept: OBGYN CLINIC | Facility: CLINIC | Age: 45
End: 2025-01-02

## 2025-01-02 VITALS
HEART RATE: 72 BPM | SYSTOLIC BLOOD PRESSURE: 165 MMHG | WEIGHT: 127 LBS | BODY MASS INDEX: 25 KG/M2 | DIASTOLIC BLOOD PRESSURE: 103 MMHG

## 2025-01-02 DIAGNOSIS — N63.13 MASS OF LOWER OUTER QUADRANT OF RIGHT BREAST: Primary | ICD-10-CM

## 2025-01-02 DIAGNOSIS — N61.0 MASTITIS, RIGHT, ACUTE: ICD-10-CM

## 2025-01-02 PROCEDURE — 99214 OFFICE O/P EST MOD 30 MIN: CPT | Performed by: OBSTETRICS & GYNECOLOGY

## 2025-01-02 PROCEDURE — 3077F SYST BP >= 140 MM HG: CPT | Performed by: OBSTETRICS & GYNECOLOGY

## 2025-01-02 PROCEDURE — 3080F DIAST BP >= 90 MM HG: CPT | Performed by: OBSTETRICS & GYNECOLOGY

## 2025-01-02 RX ORDER — CEPHALEXIN 500 MG/1
500 CAPSULE ORAL 3 TIMES DAILY
Qty: 21 CAPSULE | Refills: 1 | Status: SHIPPED | OUTPATIENT
Start: 2025-01-02

## 2025-01-02 NOTE — PROGRESS NOTES
HPI:    Patient ID: Darshana Rodriges is a 44 year old year old female.    HPI  Problem visit  Return from [place] on vacation yesterday and noted pain in the right breast and a lump yesterday.  Today began to note redness overlying it.  She has never had any breast masses or cyst.  Denies trauma.  Denies fever.  She is accustomed to doing monthly self breast exams and did not feel this last month.  Review of Systems   Constitutional: Negative.    Cardiovascular: Negative.    Gastrointestinal: Negative.    Genitourinary: Negative.    Skin: Negative.    Neurological: Negative.    Psychiatric/Behavioral: Negative.     All other systems reviewed and are negative.       Current Outpatient Medications   Medication Sig Dispense Refill    Solifenacin Succinate (VESICARE) 10 MG Oral Tab Take 1 tablet (10 mg total) by mouth daily. 90 tablet 3    Multiple Vitamins-Minerals (MULTI-VITAMIN/MINERALS) Oral Tab Take 1 tablet by mouth daily. Taking 3-4 times per week         Past Medical History:    Abnormal uterine bleeding (AUB)    Anemia    Constipation    Dysmenorrhea    Fatty liver    Fibroids    High blood pressure    Intramural leiomyoma of uterus    Menorrhagia with regular cycle    Metrorrhagia    Multiple allergies    Yeast vaginitis       Past Surgical History:   Procedure Laterality Date    Colonoscopy N/A 10/3/2022    Procedure: COLONOSCOPY;  Surgeon: Hilario Smith MD;  Location: [facility] ENDOSCOPY    Hysterectomy      Prior myomectomy      3 prev surgeries    Removal of ovarian cyst(s)  2010    Total abdom hysterectomy      Total abdominal hysterectomy  7/13/16       Family History   Problem Relation Age of Onset    Diabetes Other     Hypertension Other     Lipids Other         hyperlipidemia    Other (Other) Other         fatty liver       Social History     Socioeconomic History    Marital status:      Spouse name: Not on file    Number of children: Not on file    Years of education: Not on file     Highest education level: Not on file   Occupational History    Not on file   Tobacco Use    Smoking status: Former    Smokeless tobacco: Never   Vaping Use    Vaping status: Never Used   Substance and Sexual Activity    Alcohol use: No     Alcohol/week: 0.0 standard drinks of alcohol    Drug use: No    Sexual activity: Yes     Partners: Male   Other Topics Concern     Service Not Asked    Blood Transfusions Not Asked    Caffeine Concern Yes     Comment: soda    Occupational Exposure Not Asked    Hobby Hazards Not Asked    Sleep Concern Not Asked    Stress Concern Not Asked    Weight Concern Not Asked    Special Diet Not Asked    Back Care Not Asked    Exercise Not Asked    Bike Helmet Not Asked    Seat Belt Not Asked    Self-Exams Not Asked   Social History Narrative    Not on file     Social Drivers of Health     Financial Resource Strain: Not on file   Food Insecurity: Not on file   Transportation Needs: Not on file   Physical Activity: Medium Risk (2024)    Received from [facility]    Physical Activity     Moderate Physical Activity: Not on file     Vigorous Physical Activity: 1 to 3 days a week     Time Spent Sittin to 6 hours   Stress: Medium Risk (2024)    Received from [facility]    Stress     Stress in your Life: Moderate     Dealing with Stress: Moderately effective   Social Connections: Not on file   Housing Stability: Not on file       Physical Exam     Vitals: BP (!) 165/103 (BP Location: Right arm, Patient Position: Sitting, Cuff Size: adult)   Pulse 72   Wt 127 lb (57.6 kg)   LMP 2016   BMI 24.80 kg/m²     Constitutional: She appears well-developed and well-nourished.     Musculoskeletal: Normal range of motion of upper and lower extremities.   Neurological: She is alert and oriented x 3.   Skin: Skin is warm without pallor.  Psychiatric: Her behavior is normal. Judgment normal.  Able to communicate verbally.    HEENT:  EOMI.  CAMI.  Sclera anicteric.    Head:  Normocephalic.  Normal hair distribution.  No lesions.  Neck: Normal range of motion.      Adenopathy:  No supraclavicular or cervical adenopathy.  Thyroid:  Normal size, shape, and position.  No masses, tenderness, or nodules.    Breasts:    Symmetric bilaterally.  Areolas without lesions.  Skin- normal without growths, lesions, erythema or peau d'orange change.  Nipples- without retraction or discharge.  Tender right breast mass 2 x 2 cm and lateral to it 3 x 2.5 cm tender mass.  Mild erythema overlying the smaller more medial mass.  Axilla-  No adenopathy, mass, or tenderness.      ASSESSMENT/PLAN:  Right breast masses with rather acute onset.  Mastitis focally.  Possible infected right breast cyst versus infected mass.  Adjacent tender mass.  Tylenol or Advil as needed.  Supportive brassiere.  Diagnostic bilateral mammogram and right breast ultrasound  Treatment with Keflex and warm compresses.  Depending on findings, possible referral to breast surgical specialist.  Encounter Medications[1]           [1]   Outpatient Encounter Medications as of 1/2/2025   Medication Sig Dispense Refill    Solifenacin Succinate (VESICARE) 10 MG Oral Tab Take 1 tablet (10 mg total) by mouth daily. 90 tablet 3    Multiple Vitamins-Minerals (MULTI-VITAMIN/MINERALS) Oral Tab Take 1 tablet by mouth daily. Taking 3-4 times per week       No facility-administered encounter medications on file as of 1/2/2025.

## 2025-01-02 NOTE — TELEPHONE ENCOUNTER
Patient verified name and     Patient reports lump on breast yesterday, lump is comparable to golf ball. States she had pain in arm and chest. Patient scheduled for breast exam today. Was told if deemed necessary she would need Diagnostic Mammogram. Aware of scheduling details

## 2025-01-23 ENCOUNTER — HOSPITAL ENCOUNTER (OUTPATIENT)
Dept: ULTRASOUND IMAGING | Facility: HOSPITAL | Age: 45
Discharge: HOME | End: 2025-01-23
Attending: OBSTETRICS & GYNECOLOGY
Payer: COMMERCIAL

## 2025-01-23 ENCOUNTER — TELEPHONE (OUTPATIENT)
Dept: OBGYN CLINIC | Facility: CLINIC | Age: 45
End: 2025-01-23

## 2025-01-23 ENCOUNTER — HOSPITAL ENCOUNTER (OUTPATIENT)
Dept: MAMMOGRAPHY | Facility: HOSPITAL | Age: 45
Discharge: HOME | End: 2025-01-23
Attending: OBSTETRICS & GYNECOLOGY
Payer: COMMERCIAL

## 2025-01-23 DIAGNOSIS — N63.13 MASS OF LOWER OUTER QUADRANT OF RIGHT BREAST: ICD-10-CM

## 2025-01-23 DIAGNOSIS — N61.0 MASTITIS, RIGHT, ACUTE: ICD-10-CM

## 2025-01-23 DIAGNOSIS — N63.10 MASS OF RIGHT BREAST, UNSPECIFIED QUADRANT: Primary | ICD-10-CM

## 2025-01-23 PROCEDURE — 77066 DX MAMMO INCL CAD BI: CPT | Performed by: OBSTETRICS & GYNECOLOGY

## 2025-01-23 PROCEDURE — 76642 ULTRASOUND BREAST LIMITED: CPT | Performed by: OBSTETRICS & GYNECOLOGY

## 2025-01-23 PROCEDURE — 77062 BREAST TOMOSYNTHESIS BI: CPT | Performed by: OBSTETRICS & GYNECOLOGY

## 2025-01-23 NOTE — TELEPHONE ENCOUNTER
----- Message from Hero Rao sent at 1/23/2025 10:34 AM CST -----  Please inform of findings and recommendations:  CONCLUSION:     1. No mammographic or sonographic abnormality is seen to definitively correlate with resolved palpable abnormalities.  Clinical management is recommended.      2. Incidental note is made of a probably benign probable cluster of cysts in the right breast at 7 o'clock 3 cm from the nipple.  Recommend follow-up right breast targeted ultrasound in 6 months to assess for stability.       BI-RADS CATEGORY:     DIAGNOSTIC CATEGORY 3 - PROBABLY BENIGN FINDING.       RECOMMENDATIONS:     CLINICAL EVALUATION.    SHE HAS MASSES IN THE RIGHT BREAST, RECENT MASTITIS, AND BREASTS ARE \"EXTREMELY DENSE\" ON MAMMOGRAPHY.  I RECOMMEND THAT SHE SET UP CONSULTATION WITH BREAST SPECIALIST DR. LISA VELEZ FOR MANAGEMENT.   NEXT AVAILABLE APPOINTMENT.    SHORT TERM FOLLOW-UP ULTRASOUND RIGHT BREAST IN 6 MONTHS.

## 2025-01-24 NOTE — TELEPHONE ENCOUNTER
Pt name and  verified     Pt informed of results and recommendations. Pt verbalized understanding and agreed.  Gave pt number to contact Dr. Acosta. US ordered.

## 2025-02-21 ENCOUNTER — TELEPHONE (OUTPATIENT)
Dept: ORTHOPEDICS CLINIC | Facility: CLINIC | Age: 45
End: 2025-02-21

## 2025-02-21 DIAGNOSIS — M25.562 LEFT KNEE PAIN, UNSPECIFIED CHRONICITY: Primary | ICD-10-CM

## 2025-02-21 DIAGNOSIS — Z01.89 ENCOUNTER FOR LOWER EXTREMITY COMPARISON IMAGING STUDY: ICD-10-CM

## 2025-02-21 NOTE — TELEPHONE ENCOUNTER
Patient scheduled on [facility] with Dr Harp for the left knee. Please advise if imaging is needed  Future Appointments   Date Time Provider Department Center   3/3/2025  3:40 PM Belle Harp MD [facility] [place]

## 2025-02-21 NOTE — TELEPHONE ENCOUNTER
X-Ray ordered and scheduled. Ran[employer] message sent.    1st attempt to reach the patient. No answer.     Future Appointments   Date Time Provider Department Center   3/3/2025  2:45 PM [facility] XR IC RM1 [facility] [place]   3/3/2025  3:00 PM [facility] XR IC RM1 [facility] [place]   3/3/2025  3:40 PM Belle Harp MD [facility] [place]

## 2025-03-03 ENCOUNTER — HOSPITAL ENCOUNTER (OUTPATIENT)
Dept: GENERAL RADIOLOGY | Age: 45
Discharge: HOME | End: 2025-03-03
Attending: ORTHOPAEDIC SURGERY
Payer: COMMERCIAL

## 2025-03-03 ENCOUNTER — OFFICE VISIT (OUTPATIENT)
Dept: ORTHOPEDICS CLINIC | Facility: CLINIC | Age: 45
End: 2025-03-03
Payer: COMMERCIAL

## 2025-03-03 VITALS — HEIGHT: 60 IN | BODY MASS INDEX: 24.94 KG/M2 | WEIGHT: 127 LBS

## 2025-03-03 DIAGNOSIS — M25.562 LEFT ANTERIOR KNEE PAIN: Primary | ICD-10-CM

## 2025-03-03 DIAGNOSIS — Z01.89 ENCOUNTER FOR LOWER EXTREMITY COMPARISON IMAGING STUDY: ICD-10-CM

## 2025-03-03 DIAGNOSIS — M25.562 LEFT KNEE PAIN, UNSPECIFIED CHRONICITY: ICD-10-CM

## 2025-03-03 DIAGNOSIS — M22.42 CHONDROMALACIA OF LEFT PATELLA: ICD-10-CM

## 2025-03-03 PROCEDURE — 73562 X-RAY EXAM OF KNEE 3: CPT | Performed by: ORTHOPAEDIC SURGERY

## 2025-03-03 PROCEDURE — 3008F BODY MASS INDEX DOCD: CPT | Performed by: ORTHOPAEDIC SURGERY

## 2025-03-03 PROCEDURE — 73564 X-RAY EXAM KNEE 4 OR MORE: CPT | Performed by: ORTHOPAEDIC SURGERY

## 2025-03-03 PROCEDURE — 99203 OFFICE O/P NEW LOW 30 MIN: CPT | Performed by: ORTHOPAEDIC SURGERY

## 2025-03-03 NOTE — PROGRESS NOTES
[facility] New Patient Consult      Chief Complaint   Patient presents with    Knee Pain     LEFT KNEE   -Pain for 3 weeks  -Denies any injury or past tx       HPI: The patient is a 44 year old female who presents for orthopedic consultation with complaints of new onset anterior left knee pain.  No recent injury is reported but the patient describes increasing her workouts on an incline treadmill after which she felt anterior left knee pain with intermittent click through her arc of motion.  He has subsequently been apprehensive about stairs, getting up from the seated position, squatting and leg weight training workouts.  No obvious swelling, locking, elham instability, redness or warmth is reported.  No specific treatment has been initiated to date.      Past Medical History:    Abnormal uterine bleeding (AUB)    Anemia    Constipation    Dysmenorrhea    Fatty liver    Fibroids    High blood pressure    Intramural leiomyoma of uterus    Menorrhagia with regular cycle    Metrorrhagia    Multiple allergies    Yeast vaginitis     Past Surgical History:   Procedure Laterality Date    Colonoscopy N/A 10/3/2022    Procedure: COLONOSCOPY;  Surgeon: Hilario Smith MD;  Location: [facility] ENDOSCOPY    Hysterectomy      Prior myomectomy      3 prev surgeries    Removal of ovarian cyst(s)  2010    Total abdom hysterectomy      Total abdominal hysterectomy  7/13/16     Current Outpatient Medications   Medication Sig Dispense Refill    cephALEXin 500 MG Oral Cap Take 1 capsule (500 mg total) by mouth 3 (three) times daily. (Patient not taking: Reported on 3/3/2025) 21 capsule 1    Solifenacin Succinate (VESICARE) 10 MG Oral Tab Take 1 tablet (10 mg total) by mouth daily. (Patient not taking: Reported on 3/3/2025) 90 tablet 3    Multiple Vitamins-Minerals (MULTI-VITAMIN/MINERALS) Oral Tab Take 1 tablet by mouth daily. Taking 3-4 times per week (Patient not taking: Reported on 3/3/2025)        Allergies[1]  Family History   Problem Relation Age of Onset    Diabetes Other     Hypertension Other     Lipids Other         hyperlipidemia    Other (Other) Other         fatty liver    Breast Cancer Neg     Ovarian Cancer Neg     Prostate Cancer Neg     Pancreatic Cancer Neg      Social History     Occupational History    Not on file   Tobacco Use    Smoking status: Never     Passive exposure: Never    Smokeless tobacco: Never   Vaping Use    Vaping status: Never Used   Substance and Sexual Activity    Alcohol use: No     Alcohol/week: 0.0 standard drinks of alcohol    Drug use: No    Sexual activity: Yes     Partners: Male        ROS:  Complete ROS reviewed by me and non-contributory to the chief complaint except as mentioned above.    Physical Exam:    Ht 5' (1.524 m)   Wt 127 lb (57.6 kg)   LMP 06/29/2016   BMI 24.80 kg/m²   Constitutional: Well developed, well nourished 44 year old female  Psychological: NAD, alert and appropriate  Respiratory: Breathing comfortably on room air with RR of 12-14  Cardiac: Palpable distal pulses with pink warm extremities distally  Evaluation of the knees reveals neutral alignment with nonantalgic gait.  There is no visible swelling or discoloration.  Palpation reveals no warmth or effusion.  Range of motion reveals adequate full extension and 135 degrees of flexion.  There is minimal patellofemoral click and crepitus during early flexion and late extension with a negative patellofemoral grind test.  Medial and lateral retinacular tissues are minimally tender.  Tibiofemoral joint lines are nontender.  Both Babs's and Steinmann's tests are negative.  Varus, valgus, anterior and posterior stress tests are negative including a negative Lachman test.  No significant distal foot and ankle edema is noted.  Neurovascular status is intact distally.    Imaging: Multiple views of the left knee comparisons to the right personally viewed, independently interpreted and radiology  report read.  No acute bony findings are noted.  Joint spaces are preserved.    Assessment/Diagnoses:  Diagnoses and all orders for this visit:    Left anterior knee pain    Chondromalacia of left patella        Plan: We reviewed imaging and exam findings with the patient.  Knee pain is likely due to patellofemoral syndrome and chondromalacia.  Although joint spaces are adequately preserved, I explained that the durability of articular cartilage can degenerate over time.  This can result in intermittent mild swelling, clicking and occasional sharp pains.  Activities such as lunging, squatting, impact and excessive use of stairs or inclines can exacerbate symptoms.  We discussed at length initial conservative treatment recommendations including activity modification, quadriceps stretching, oral and topical over-the-counter anti-inflammatories, and icing.  The patient was counseled on specific quadriceps stretching exercises which were also demonstrated.  These will be better tolerated after moist heat such as after a warm shower or bath.  Cross training with less intense forms of exercise including swimming, cycling and elliptical should be better tolerated.  All questions were answered and the patient appeared satisfied.  Follow-up is recommended if there is any progression in pain, mechanical catching, locking, instability or new swelling.  Further imaging with an MRI may be indicated if these symptoms arise.      Belle Harp MD, [facility]  Orthopaedic Surgery   Sports Medicine/Knee and Shoulder  [facility]  t: [phone]  f: [phone]             This document was partially prepared using Dragon Medical voice recognition software.  Although every attempt is made to correct errors during dictation, discrepancies may still exist.           [1]   Allergies  Allergen Reactions    Plasma, Human HIVES    Dust OTHER (SEE COMMENTS)     Sinus congestion ,headache

## 2025-08-26 ENCOUNTER — OFFICE VISIT (OUTPATIENT)
Dept: FAMILY MEDICINE CLINIC | Facility: CLINIC | Age: 45
End: 2025-08-26

## 2025-08-26 VITALS
HEART RATE: 66 BPM | WEIGHT: 131 LBS | HEIGHT: 60 IN | BODY MASS INDEX: 25.72 KG/M2 | SYSTOLIC BLOOD PRESSURE: 116 MMHG | DIASTOLIC BLOOD PRESSURE: 74 MMHG

## 2025-08-26 DIAGNOSIS — Z00.00 WELLNESS EXAMINATION: Primary | ICD-10-CM

## 2025-08-26 DIAGNOSIS — R35.0 URINARY FREQUENCY: ICD-10-CM

## 2025-08-26 LAB
APPEARANCE: CLEAR
BILIRUBIN: NEGATIVE
GLUCOSE (URINE DIPSTICK): NEGATIVE MG/DL
KETONES (URINE DIPSTICK): NEGATIVE MG/DL
LEUKOCYTES: NEGATIVE
MULTISTIX LOT#: NORMAL NUMERIC
NITRITE, URINE: NEGATIVE
OCCULT BLOOD: NEGATIVE
PH, URINE: 7 (ref 4.5–8)
PROTEIN (URINE DIPSTICK): NEGATIVE MG/DL
SPECIFIC GRAVITY: 1.01 (ref 1–1.03)
URINE-COLOR: YELLOW
UROBILINOGEN,SEMI-QN: 0.2 MG/DL (ref 0–1.9)

## (undated) DEVICE — STERILE LATEX POWDER-FREE SURGICAL GLOVESWITH NITRILE COATING: Brand: PROTEXIS

## (undated) DEVICE — CONMED SCOPE SAVER BITE BLOCK, 20X27 MM: Brand: SCOPE SAVER

## (undated) DEVICE — KIT ENDO ORCAPOD 160/180/190

## (undated) DEVICE — LINE MNTR ADLT SET O2 INTMD

## (undated) DEVICE — FORCEP RADIAL JAW 4

## (undated) DEVICE — MEDI-VAC NON-CONDUCTIVE SUCTION TUBING 6MM X 1.8M (6FT.) L: Brand: CARDINAL HEALTH

## (undated) DEVICE — KIT CLEAN ENDOKIT 1.1OZ GOWNX2

## (undated) DEVICE — 35 ML SYRINGE REGULAR TIP: Brand: MONOJECT

## (undated) NOTE — LETTER
2/11/2020          To Whom It May Concern:    Monie Acosta is currently under my medical care and may not return to work at this time. She may return to work on 2/13/20  Activity is restricted as follows: none.     If you require additional information

## (undated) NOTE — LETTER
09/23/20        Mame Pulido  [address]      Dear Valerie Kat records indicate that you have outstanding lab work and or testing that was ordered for you and has not yet been completed:  Orders Placed This Encounter      Rhianna Vann

## (undated) NOTE — LETTER
07/21/20        Polina Gilbert  [address]      Dear Jennifer Drivers records indicate that you have outstanding lab work and or testing that was ordered for you and has not yet been completed:  Orders Placed This Encounter      Jolene

## (undated) NOTE — ED AVS SNAPSHOT
Reyna Marquez   MRN: [id number]    Department:  [facility] Emergency Department   Date of Visit:  3/1/2019           Disclosure     Insurance plans vary and the physician(s) referred by the ER may not be covered by your plan.  Please contact you CARE PHYSICIAN AT ONCE OR RETURN IMMEDIATELY TO THE EMERGENCY DEPARTMENT. If you have been prescribed any medication(s), please fill your prescription right away and begin taking the medication(s) as directed.   If you believe that any of the medications

## (undated) NOTE — LETTER
9/17/2020          To Whom It May Concern:    Tosin Kent is currently under my medical care and may not return to work at this time. Please excuse Jose Garcia for her work absence. She may return to work on 9/28/2020.     If you require additional inform

## (undated) NOTE — LETTER
03/27/21        Austin Obando  [address]      Dear Lacey Salinas records indicate that you have outstanding lab work and or testing that was ordered for you and has not yet been completed:  Orders Placed This Encounter      Co

## (undated) NOTE — LETTER
[address] [place], [place]      Authorization for Surgical Operation and Procedure     Date:___________                                                                                                         Time:__________  1. I hereby authorizeSudheer Smith MD, my physician and his/her assistants (if applicable), which may include medical students, residents, and/or fellows, to perform the following surgical operation/ procedure and administer such anesthesia as may be determined necessary by my physician:  Operation/Procedure name (s) COLONOSCOPY/ESOPHAGOGASTRODUODENOSCOPY (EGD)  on [facility]   2. I recognize that during the surgical operation/procedure, unforeseen conditions may necessitate additional or different procedures than those listed above. I, therefore, further authorize and request that the above-named surgeon, assistants, or designees perform such procedures as are, in their judgment, necessary and desirable. 3.   My surgeon/physician has discussed prior to my surgery the potential benefits, risks and side effects of this procedure; the likelihood of achieving goals; and potential problems that might occur during recuperation. They also discussed reasonable alternatives to the procedure, including risks, benefits, and side effects related to the alternatives and risks related to not receiving this procedure. I have had all my questions answered and I acknowledge that no guarantee has been made as to the result that may be obtained. 4.   Should the need arise during my operation or immediate post-operative period, I also consent to the administration of blood and/or blood products. Further, I understand that despite careful testing and screening of blood or blood products by collecting agencies, I may still be subject to ill effects as a result of receiving a blood transfusion and/or blood products.   The following are some, but not all, of the potential risks that can occur: fever and allergic reactions, hemolytic reactions, transmission of diseases such as Hepatitis, AIDS and Cytomegalovirus (CMV) and fluid overload. In the event that I wish to have an autologous transfusion of my own blood, or a directed donor transfusion. I will discuss this with my physician. 5.   I authorize the use of any specimen, organs, tissues, body parts or foreign objects that may be removed from my body during the operation/procedure for diagnosis, research or teaching purposes and their subsequent disposal by hospital authorities. I also authorize the release of specimen test results and/or written reports to my treating physician on the hospital medical staff or other referring or consulting physicians involved in my care, at the discretion of the Pathologist or my treating physician. 6.   I consent to the photographing or videotaping of the operations or procedures to be performed, including appropriate portions of my body for medical, scientific, or educational purposes, provided my identity is not revealed by the pictures or by descriptive texts accompanying them. If the procedure has been photographed/videotaped, the surgeon will obtain the original picture, image, videotape or CD. The hospital will not be responsible for storage, release or maintenance of the picture, image, tape or CD.    7.   I consent to the presence of a  or observers in the operating room as deemed necessary by my physician or their designees. 8.   I recognize that in the event my procedure results in extended X-Ray/fluoroscopy time, I may develop a skin reaction. 9. If I have a Do Not Attempt Resuscitation (DNAR) order in place, that status will be suspended while in the operating room, procedural suite, and during the recovery period unless otherwise explicitly stated by me (or a person authorized to consent on my behalf).  The surgeon or my attending physician will determine when the applicable recovery period ends for purposes of reinstating the DNAR order. 10. Patients having a sterilization procedure: I understand that if the procedure is successful the results will be permanent and it will therefore be impossible for me to inseminate, conceive, or bear children. I also understand that the procedure is intended to result in sterility, although the result has not been guaranteed. 11. I acknowledge that my physician has explained sedation/analgesia administration to me including the risk and benefits I consent to the administration of sedation/analgesia as may be necessary or desirable in the judgment of my physician. I CERTIFY THAT I HAVE READ AND FULLY UNDERSTAND THE ABOVE CONSENT TO OPERATION and/or OTHER PROCEDURE.    _________________________________________  __________________________________  Signature of Patient     Signature of Responsible Person         ___________________________________         Printed Name of Responsible Person           _________________________________                  Relationship to Patient  _________________________________________  ______________________________  Signature of Witness          Date  Time    STATEMENT OF PHYSICIAN My signature below affirms that prior to the time of the procedure; I have explained to the patient and/or his/her legal representative, the risks and benefits involved in the proposed treatment and any reasonable alternative to the proposed treatment. I have also explained the risks and benefits involved in refusal of the proposed treatment and alternatives to the proposed treatment and have answered the patient's questions. If I have a significant financial interest in a co-management agreement or a significant financial interest in any product or implant, or other significant relationship used in this procedure/surgery, I have disclosed this and had a discussion with my patient. _______________________________________________________________ _____________________________  Hayder Gasca Physician)                                                                                         (Date)                                   (Time)        Patient Name: Lilliana Prasad    : 1980   Printed: 2022      Medical Record #: [id number]                                              Page 1 of 1

## (undated) NOTE — LETTER
12/23/20        Anselmo Barber  [address]      Dear Ludwig Llanos records indicate that you have outstanding lab work and or testing that was ordered for you and has not yet been completed:  Orders Placed This Encounter      Jazmyne Steinberg

## (undated) NOTE — LETTER
9/17/2020          To Whom It May Concern:    Ethan Rosado is currently under my medical care and may not return to work at this time. Please excuse Ralph Huff for her work absence on 9/17 and 9/18. She may return to work on 9/21/2020.     If you require

## (undated) NOTE — LETTER
2/10/2020          To Whom It May Concern:    Ethan Rosado is currently under my medical care and may not return to work at this time. Please excuse Ralph Huff for 2 days. She may return to work on 2/12/20. Activity is restricted as follows: none.     I

## (undated) NOTE — LETTER
9/18/2018              sAhok Muñoz        [address] [place] [id number]         Dear Jyoti Garcia,    [facility] records indicate that the tests ordered for you by Zhanna Hyde MD  have not been done.   If you have, in fact, already completed the